# Patient Record
Sex: FEMALE | Race: WHITE | NOT HISPANIC OR LATINO | Employment: OTHER | URBAN - METROPOLITAN AREA
[De-identification: names, ages, dates, MRNs, and addresses within clinical notes are randomized per-mention and may not be internally consistent; named-entity substitution may affect disease eponyms.]

---

## 2017-07-17 ENCOUNTER — TRANSFERRED RECORDS (OUTPATIENT)
Dept: MULTI SPECIALTY CLINIC | Facility: CLINIC | Age: 62
End: 2017-07-17

## 2021-10-14 ENCOUNTER — OFFICE VISIT (OUTPATIENT)
Dept: FAMILY MEDICINE | Facility: CLINIC | Age: 66
End: 2021-10-14
Payer: MEDICARE

## 2021-10-14 VITALS
TEMPERATURE: 97.1 F | OXYGEN SATURATION: 95 % | WEIGHT: 118.6 LBS | RESPIRATION RATE: 16 BRPM | DIASTOLIC BLOOD PRESSURE: 89 MMHG | SYSTOLIC BLOOD PRESSURE: 138 MMHG | HEART RATE: 98 BPM

## 2021-10-14 DIAGNOSIS — D13.1 BENIGN FUNDIC GLAND POLYPS OF STOMACH: ICD-10-CM

## 2021-10-14 DIAGNOSIS — K21.9 GASTROESOPHAGEAL REFLUX DISEASE WITHOUT ESOPHAGITIS: Primary | ICD-10-CM

## 2021-10-14 PROBLEM — J45.20 INTERMITTENT ASTHMA: Status: ACTIVE | Noted: 2021-10-14

## 2021-10-14 PROBLEM — F41.9 ANXIETY: Status: ACTIVE | Noted: 2021-10-14

## 2021-10-14 PROBLEM — R73.01 ELEVATED FASTING GLUCOSE: Status: ACTIVE | Noted: 2021-10-14

## 2021-10-14 LAB
ERYTHROCYTE [DISTWIDTH] IN BLOOD BY AUTOMATED COUNT: 13 % (ref 10–15)
HCT VFR BLD AUTO: 45.2 % (ref 35–47)
HGB BLD-MCNC: 15 G/DL (ref 11.7–15.7)
MCH RBC QN AUTO: 32.1 PG (ref 26.5–33)
MCHC RBC AUTO-ENTMCNC: 33.2 G/DL (ref 31.5–36.5)
MCV RBC AUTO: 97 FL (ref 78–100)
PLATELET # BLD AUTO: 307 10E3/UL (ref 150–450)
RBC # BLD AUTO: 4.67 10E6/UL (ref 3.8–5.2)
WBC # BLD AUTO: 8.9 10E3/UL (ref 4–11)

## 2021-10-14 PROCEDURE — 85027 COMPLETE CBC AUTOMATED: CPT | Performed by: PHYSICIAN ASSISTANT

## 2021-10-14 PROCEDURE — 36415 COLL VENOUS BLD VENIPUNCTURE: CPT | Performed by: PHYSICIAN ASSISTANT

## 2021-10-14 PROCEDURE — 99203 OFFICE O/P NEW LOW 30 MIN: CPT | Performed by: PHYSICIAN ASSISTANT

## 2021-10-14 PROCEDURE — 80053 COMPREHEN METABOLIC PANEL: CPT | Performed by: PHYSICIAN ASSISTANT

## 2021-10-14 RX ORDER — ALPRAZOLAM 0.5 MG
TABLET ORAL
COMMUNITY
Start: 2021-08-27 | End: 2022-08-23

## 2021-10-14 RX ORDER — FAMOTIDINE 20 MG/1
60 TABLET, FILM COATED ORAL
COMMUNITY
End: 2022-08-23

## 2021-10-14 RX ORDER — FLUTICASONE PROPIONATE 110 UG/1
AEROSOL, METERED RESPIRATORY (INHALATION)
COMMUNITY
Start: 2017-06-14 | End: 2022-12-16

## 2021-10-14 RX ORDER — VIT C/E/ZN/COPPR/LUTEIN/ZEAXAN 60 MG-6 MG
1 CAPSULE ORAL DAILY
COMMUNITY

## 2021-10-14 RX ORDER — CITALOPRAM HYDROBROMIDE 10 MG/1
TABLET ORAL
COMMUNITY
Start: 2020-10-01 | End: 2021-11-02

## 2021-10-14 RX ORDER — CALCIUM CARBONATE 500 MG/1
1 TABLET, CHEWABLE ORAL 2 TIMES DAILY PRN
COMMUNITY

## 2021-10-14 RX ORDER — ESOMEPRAZOLE MAGNESIUM 40 MG/1
40 CAPSULE, DELAYED RELEASE ORAL
Qty: 60 CAPSULE | Refills: 1 | Status: SHIPPED | OUTPATIENT
Start: 2021-10-14 | End: 2021-11-02

## 2021-10-14 NOTE — PROGRESS NOTES
Assessment and Plan:     (K21.9) Gastroesophageal reflux disease without esophagitis  (primary encounter diagnosis)  Comment: history of GERD, has been off and on nexium x years, recent bout has been about 6 weeks but hasn't been consistent with PPI, no bloody emesis or stool, no significant weight loss  Plan: CBC with platelets, Comprehensive metabolic         panel (BMP + Alb, Alk Phos, ALT, AST, Total.         Bili, TP), esomeprazole (NEXIUM) 40 MG DR         capsule        Will have her take Nexium daily for next 4-6 weeks, return to clinic if does not resolve, would refer to GI at that time    (D13.1) Benign fundic gland polyps of stomach  Comment: last EGD in 2017 in [place]  Plan: see above    Establish care with Dr. Boyer scheduled in 2 weeks  We discussed reasons to go to the ED      Jenn Muir PA-C        Nathalie Hargrove is a 65 year old who presents for the following health issues    HPI       Chief Complaint   Patient presents with     Gastrointestinal Problem     per pt Acid Reflux x 6 weeks         Moved here a year ago from [place], has not established care w/a pcp  Has had GERD symptoms x 6 weeks  Reports burning and acid taste in the back of throat, worsens in the AM  Symptoms are intermittent and mild  She tried taking Nexium x 10 days and now is on pepcid which has helped     Also reports general soreness around umbilical area--has had before, attributes to anxiety, IBS and GERD    She reports intermittent loose stools which is typical for her    She denies chest pain, shortness of breath, hemoptysis, fever/chills, vomiting, dysuria/frequency    She hasn't had any bloody/black stools, significant weight loss    She had a normal upper endoscopy in 2017 in [place]    Review of Systems   See above      Objective      /89 (BP Location: Left arm, Patient Position: Sitting, Cuff Size: Adult Regular)   Pulse 98   Temp 97.1  F (36.2  C) (Temporal)   Resp 16   Wt 53.8 kg (118 lb 9.6 oz)   SpO2  95%   There is no height or weight on file to calculate BMI.     Physical Exam     GENERAL: healthy, alert and no distress  ENT: mmm, op clear  RESP: lungs clear to auscultation - no rales, no rhonchi, no wheezes  CV: regular rates and rhythm, normal S1 S2, no S3 or S4 and no murmur, no click or rub   ABD: soft, NT, +BS, no masses  MS: extremities- no gross deformities noted, no edema

## 2021-10-15 LAB
ALBUMIN SERPL-MCNC: 3.7 G/DL (ref 3.4–5)
ALP SERPL-CCNC: 76 U/L (ref 40–150)
ALT SERPL W P-5'-P-CCNC: 38 U/L (ref 0–50)
ANION GAP SERPL CALCULATED.3IONS-SCNC: 4 MMOL/L (ref 3–14)
AST SERPL W P-5'-P-CCNC: 25 U/L (ref 0–45)
BILIRUB SERPL-MCNC: 0.9 MG/DL (ref 0.2–1.3)
BUN SERPL-MCNC: 12 MG/DL (ref 7–30)
CALCIUM SERPL-MCNC: 9.1 MG/DL (ref 8.5–10.1)
CHLORIDE BLD-SCNC: 101 MMOL/L (ref 94–109)
CO2 SERPL-SCNC: 29 MMOL/L (ref 20–32)
CREAT SERPL-MCNC: 0.6 MG/DL (ref 0.52–1.04)
GFR SERPL CREATININE-BSD FRML MDRD: >90 ML/MIN/1.73M2
GLUCOSE BLD-MCNC: 95 MG/DL (ref 70–99)
POTASSIUM BLD-SCNC: 3.9 MMOL/L (ref 3.4–5.3)
PROT SERPL-MCNC: 7.8 G/DL (ref 6.8–8.8)
SODIUM SERPL-SCNC: 134 MMOL/L (ref 133–144)

## 2021-10-17 ENCOUNTER — HEALTH MAINTENANCE LETTER (OUTPATIENT)
Age: 66
End: 2021-10-17

## 2021-10-21 DIAGNOSIS — D13.1 BENIGN FUNDIC GLAND POLYPS OF STOMACH: Primary | ICD-10-CM

## 2021-11-01 ASSESSMENT — ENCOUNTER SYMPTOMS
PALPITATIONS: 0
JOINT SWELLING: 0
SORE THROAT: 0
DIZZINESS: 0
COUGH: 0
DIARRHEA: 0
NAUSEA: 0
MYALGIAS: 0
SHORTNESS OF BREATH: 0
ABDOMINAL PAIN: 1
HEADACHES: 0
CHILLS: 0
HEMATOCHEZIA: 0
FREQUENCY: 0
HEARTBURN: 1
HEMATURIA: 0
DYSURIA: 0
FEVER: 0
ARTHRALGIAS: 0
EYE PAIN: 0
BREAST MASS: 0
CONSTIPATION: 0
PARESTHESIAS: 0
NERVOUS/ANXIOUS: 1
WEAKNESS: 0

## 2021-11-01 ASSESSMENT — ACTIVITIES OF DAILY LIVING (ADL): CURRENT_FUNCTION: NO ASSISTANCE NEEDED

## 2021-11-01 NOTE — PROGRESS NOTES
"SUBJECTIVE:   Beena Diaz is a 65 year old female who presents for Preventive Visit.    {Split Bill scripting  The purpose of this visit is to discuss your medical history and prevent health problems before you are sick. You may be responsible for a co-pay, coinsurance, or deductible if your visit today includes services such as checking on a sore throat, having an x-ray or lab test, or treating and evaluating a new or existing condition :551245}  Patient has been advised of split billing requirements and indicates understanding: {Yes and No:518760}   Are you in the first 12 months of your Medicare coverage?  { :804242::\"No\"}    Healthy Habits:     In general, how would you rate your overall health?  Excellent    Frequency of exercise:  4-5 days/week    Duration of exercise:  30-45 minutes    Do you usually eat at least 4 servings of fruit and vegetables a day, include whole grains    & fiber and avoid regularly eating high fat or \"junk\" foods?  Yes    Taking medications regularly:  Yes    Medication side effects:  None    Ability to successfully perform activities of daily living:  No assistance needed    Home Safety:  No safety concerns identified    Hearing Impairment:  No hearing concerns    In the past 6 months, have you been bothered by leaking of urine?  No    In general, how would you rate your overall mental or emotional health?  Good      PHQ-2 Total Score: 0    Additional concerns today:  No    Do you feel safe in your environment? { :009718}    Have you ever done Advance Care Planning? (For example, a Health Directive, POLST, or a discussion with a medical provider or your loved ones about your wishes): { :006390}    {Hearing Test Done (Optional):411851}   Fall risk  { :558512}  {If any of the above assessments are answered yes, consider ordering appropriate referrals (Optional):588139::\"click delete button to remove this line now\"}  Cognitive Screening { :489569}    {Do you have sleep apnea, " excessive snoring or daytime drowsiness? (Optional):141394}    Reviewed and updated as needed this visit by clinical staff                 Reviewed and updated as needed this visit by Provider                Social History     Tobacco Use     Smoking status: Never Smoker     Smokeless tobacco: Never Used   Substance Use Topics     Alcohol use: Not Currently     {Rooming Staff- Complete this question if Prescreen response is not shown below for today's visit. If you drink alcohol do you typically have >3 drinks per day or >7 drinks per week? (Optional):337162}    Alcohol Use 11/1/2021   Prescreen: >3 drinks/day or >7 drinks/week? No   {add AUDIT responses (Optional) (A score of 7 for adult men is an indication of hazardous drinking; a score of 8 or more is an indication of an alcohol use disorder.  A score of 7 or more for adult women is an indication of hazardous drinking or an alchohol use disorder):715191}    {Outside tests to abstract? :402216}    {additional problems to add (Optional):683339}    Current providers sharing in care for this patient include: {Rooming staff:  Please update Care Team in Rooming Activity, refresh this note and then delete this statement}  Patient Care Team:  No Ref-Primary, Physician as PCP - General  Jenn Dallas PA-C as Assigned PCP  Dereje Kahn MD as MD (Gastroenterology)  Jenn Dallas PA-C as Physician Assistant (Internal Medicine)    The following health maintenance items are reviewed in Epic and correct as of today:  Health Maintenance Due   Topic Date Due     DEXA  Never done     ANNUAL REVIEW OF HM ORDERS  Never done     ASTHMA ACTION PLAN  Never done     ASTHMA CONTROL TEST  Never done     ADVANCE CARE PLANNING  Never done     MAMMO SCREENING  Never done     Pneumococcal Vaccine: 65+ Years (1 of 2 - PPSV23) 12/12/1961     COLORECTAL CANCER SCREENING  Never done     HEPATITIS C SCREENING  Never done     DTAP/TDAP/TD IMMUNIZATION (1 -  "Tdap) Never done     LIPID  Never done     ZOSTER IMMUNIZATION (1 of 2) Never done     MEDICARE ANNUAL WELLNESS VISIT  Never done     {Chronicprobdata (optional):305515}  {Decision Support (Optional):845368}    Breast CA Risk Assessment (FHS-7) 11/1/2021   Do you have a family history of breast, colon, or ovarian cancer? No / Unknown       {If any of the questions to the BCRA (FHS-7) are answered yes, consider ordering referral for genetic counseling (Optional) :825534::\"click delete button to remove this line now\"}  {AMB Mammogram Decision Support (Optional) :246368}  Pertinent mammograms are reviewed under the imaging tab.    Review of Systems   Constitutional: Negative for chills and fever.   HENT: Negative for congestion, ear pain, hearing loss and sore throat.    Eyes: Negative for pain and visual disturbance.   Respiratory: Negative for cough and shortness of breath.    Cardiovascular: Negative for chest pain, palpitations and peripheral edema.   Gastrointestinal: Positive for abdominal pain and heartburn. Negative for constipation, diarrhea, hematochezia and nausea.   Breasts:  Negative for tenderness, breast mass and discharge.   Genitourinary: Negative for dysuria, frequency, genital sores, hematuria, pelvic pain, urgency, vaginal bleeding and vaginal discharge.   Musculoskeletal: Negative for arthralgias, joint swelling and myalgias.   Skin: Negative for rash.   Neurological: Negative for dizziness, weakness, headaches and paresthesias.   Psychiatric/Behavioral: Negative for mood changes. The patient is nervous/anxious.      {ROS COMP (Optional):411122}    OBJECTIVE:   There were no vitals taken for this visit. There is no height or weight on file to calculate BMI.  Physical Exam  {Exam (Optional) :535126}    {Diagnostic Test Results (Optional):268194::\"Diagnostic Test Results:\",\"Labs reviewed in Epic\"}    ASSESSMENT / PLAN:   {Diag Picklist:011805}    Patient has been advised of split billing requirements " "and indicates understanding: {YES / NO:079258::\"Yes\"}  COUNSELING:  {Medicare Counselin}    There is no height or weight on file to calculate BMI.    {Weight Management Plan (ACO) Complete if BMI is abnormal-  Ages 18-64  BMI >24.9.  Age 65+ with BMI <23 or >30 (Optional):646176}    She reports that she has never smoked. She has never used smokeless tobacco.      Appropriate preventive services were discussed with this patient, including applicable screening as appropriate for cardiovascular disease, diabetes, osteopenia/osteoporosis, and glaucoma.  As appropriate for age/gender, discussed screening for colorectal cancer, prostate cancer, breast cancer, and cervical cancer. Checklist reviewing preventive services available has been given to the patient.    Reviewed patients plan of care and provided an AVS. The {CarePlan:617864} for Beena meets the Care Plan requirement. This Care Plan has been established and reviewed with the {PATIENT, FAMILY MEMBER, CAREGIVER:704184}.    Counseling Resources:  ATP IV Guidelines  Pooled Cohorts Equation Calculator  Breast Cancer Risk Calculator  Breast Cancer: Medication to Reduce Risk  FRAX Risk Assessment  ICSI Preventive Guidelines  Dietary Guidelines for Americans,   [employer]'s MyPlate  ASA Prophylaxis  Lung CA Screening    Tammy Boyer MD  [facility] [place]    Identified Health Risks:  "

## 2021-11-02 ENCOUNTER — OFFICE VISIT (OUTPATIENT)
Dept: FAMILY MEDICINE | Facility: CLINIC | Age: 66
End: 2021-11-02
Payer: MEDICARE

## 2021-11-02 VITALS
HEART RATE: 93 BPM | SYSTOLIC BLOOD PRESSURE: 155 MMHG | WEIGHT: 118 LBS | TEMPERATURE: 97.5 F | BODY MASS INDEX: 18.96 KG/M2 | DIASTOLIC BLOOD PRESSURE: 88 MMHG | RESPIRATION RATE: 16 BRPM | OXYGEN SATURATION: 98 % | HEIGHT: 66 IN

## 2021-11-02 DIAGNOSIS — Z13.29 SCREENING FOR THYROID DISORDER: ICD-10-CM

## 2021-11-02 DIAGNOSIS — F41.1 GENERALIZED ANXIETY DISORDER: ICD-10-CM

## 2021-11-02 DIAGNOSIS — Z11.59 SCREENING FOR VIRAL DISEASE: ICD-10-CM

## 2021-11-02 DIAGNOSIS — K58.9 IRRITABLE BOWEL SYNDROME, UNSPECIFIED TYPE: ICD-10-CM

## 2021-11-02 DIAGNOSIS — Z11.59 NEED FOR HEPATITIS C SCREENING TEST: ICD-10-CM

## 2021-11-02 DIAGNOSIS — J45.990 EXERCISE-INDUCED ASTHMA: ICD-10-CM

## 2021-11-02 DIAGNOSIS — E78.5 HYPERLIPIDEMIA, UNSPECIFIED HYPERLIPIDEMIA TYPE: ICD-10-CM

## 2021-11-02 DIAGNOSIS — Z78.0 ASYMPTOMATIC POSTMENOPAUSAL STATUS: ICD-10-CM

## 2021-11-02 DIAGNOSIS — K31.7 BENIGN GASTRIC POLYP: ICD-10-CM

## 2021-11-02 DIAGNOSIS — K21.9 GASTROESOPHAGEAL REFLUX DISEASE WITHOUT ESOPHAGITIS: Primary | ICD-10-CM

## 2021-11-02 DIAGNOSIS — Z12.31 VISIT FOR SCREENING MAMMOGRAM: ICD-10-CM

## 2021-11-02 DIAGNOSIS — R03.0 ELEVATED BLOOD PRESSURE READING WITHOUT DIAGNOSIS OF HYPERTENSION: ICD-10-CM

## 2021-11-02 PROBLEM — R73.03 PREDIABETES: Status: ACTIVE | Noted: 2017-04-18

## 2021-11-02 PROBLEM — K58.0 IRRITABLE BOWEL SYNDROME WITH DIARRHEA: Status: ACTIVE | Noted: 2020-01-09

## 2021-11-02 PROBLEM — F41.9 ANXIETY: Status: ACTIVE | Noted: 2017-04-14

## 2021-11-02 PROBLEM — Z86.0101 HX OF ADENOMATOUS COLONIC POLYPS: Status: ACTIVE | Noted: 2017-06-09

## 2021-11-02 PROBLEM — Z98.890 HX OF COLONOSCOPY: Status: ACTIVE | Noted: 2017-06-09

## 2021-11-02 PROBLEM — R51.9 HEADACHE: Status: ACTIVE | Noted: 2019-05-09

## 2021-11-02 PROCEDURE — 99215 OFFICE O/P EST HI 40 MIN: CPT | Performed by: INTERNAL MEDICINE

## 2021-11-02 RX ORDER — ALBUTEROL SULFATE 90 UG/1
AEROSOL, METERED RESPIRATORY (INHALATION)
COMMUNITY
Start: 2021-02-01 | End: 2022-12-16

## 2021-11-02 RX ORDER — CITALOPRAM HYDROBROMIDE 10 MG/1
15 TABLET ORAL DAILY
Qty: 45 TABLET | Refills: 1 | Status: SHIPPED | OUTPATIENT
Start: 2021-11-02 | End: 2021-12-07

## 2021-11-02 ASSESSMENT — ENCOUNTER SYMPTOMS
DIZZINESS: 0
SHORTNESS OF BREATH: 0
HEMATOCHEZIA: 0
JOINT SWELLING: 0
SORE THROAT: 0
FREQUENCY: 0
BREAST MASS: 0
CHILLS: 0
CONSTIPATION: 0
HEARTBURN: 1
WEAKNESS: 0
NERVOUS/ANXIOUS: 1
ARTHRALGIAS: 0
DIARRHEA: 0
FEVER: 0
NAUSEA: 0
HEADACHES: 0
DYSURIA: 0
PALPITATIONS: 0
MYALGIAS: 0
PARESTHESIAS: 0
HEMATURIA: 0
COUGH: 0
EYE PAIN: 0
ABDOMINAL PAIN: 1

## 2021-11-02 ASSESSMENT — ACTIVITIES OF DAILY LIVING (ADL): CURRENT_FUNCTION: NO ASSISTANCE NEEDED

## 2021-11-02 ASSESSMENT — MIFFLIN-ST. JEOR: SCORE: 1091.99

## 2021-11-02 NOTE — PATIENT INSTRUCTIONS
Continue pepcid   Go to down to 20 mg twice a day after symptoms are under control  You are due for mammogram and bone density  Please call the following number to make appointment :  [phone]  It is located in [address]  The dose of celexa  is increased to 15 mg daily  Follow up in 6-8 weeks  Seek sooner medical attention if there is any worsening of symptoms or problems.

## 2021-11-02 NOTE — PROGRESS NOTES
Assessment & Plan     Diagnoses and all orders for this visit:    Gastroesophageal reflux disease without esophagitis  She is a new patient to me  She has history of acid reflux and gastric polyps  She had endoscopy in 2017  Results are available in care everywhere  Those were reviewed she does not feel comfortable taking Nexium  As worry about side effects  She is taking Pepcid 40 mg at night and 20 mg during daytime  We discussed about lowering the dose after taking it for a few weeks to 20 mg twice a day  Either she can do 40 mg at bedtime or 20 mg twice a day which is average dose  Patient is in agreement  Her symptoms are improving  She is also taking Tums on as-needed basis  She has appointment to see a gastroenterologist in February  As her endoscopy results were benign and her symptoms are improving  I am okay to wait till that time as a specialist's are very busy  If anything changes we can request sooner appointment  Her anxiety is making her acid reflux symptoms worse  She got  from her   She moved from [place]    Benign gastric polyp  See the note above  There is a results of endoscopy available in care everywhere dated July 17, 2017    Generalized anxiety disorder  -     citalopram (CELEXA) 10 MG tablet; Take 1.5 tablets (15 mg) by mouth daily  Patient has lots of anxiety  She is followed by the counselor  The trigger was that her  and she got   Her  brought her here from [place] and then left  She has a daughter who lives here  She is on 10 mg of Celexa  She would like to increase little bit  As her anxiety makes her acid reflux worse as well as gives her some jaw pain  It is on the right side    Irritable bowel syndrome, unspecified type  It is well controlled currently    Exercise-induced asthma  It is controlled    Hyperlipidemia, unspecified hyperlipidemia type  -     Lipid panel reflex to direct LDL Fasting; Future    Screening for thyroid disorder  -  "    TSH with free T4 reflex; Future    Need for hepatitis C screening test  -     Hepatitis C Screen Reflex to HCV RNA Quant and Genotype; Future    Visit for screening mammogram  -     MA Screen Bilateral w/Kevin; Future    Screening for viral disease  -     Hepatitis C Screen Reflex to HCV RNA Quant and Genotype; Future    Asymptomatic postmenopausal status  -     DX Hip/Pelvis/Spine; Future    We discussed about cardiac work-up but patient says she does not have any risk factors and she feels comfortable not pursuing that  Per patient she had that in the past    I reviewed some of the information from care everywhere    Patient has elevated blood pressure  She thinks is anxiety related  I will send her a note to monitor that and let me know if it stays consistently high  Patient would be considered if needed     See Patient Instructions  Patient Instructions   Continue pepcid   Go to down to 20 mg twice a day after symptoms are under control  You are due for mammogram and bone density  Please call the following number to make appointment :  [phone]  It is located in [address]  The dose of celexa  is increased to 15 mg daily  Follow up in 6-8 weeks  Seek sooner medical attention if there is any worsening of symptoms or problems.            Return in about 2 months (around 1/2/2022) for wellness visit.    Tammy Boyer MD  [facility] FRANKY Hargrove is a 65 year old who presents for the following health issues     Healthy Habits:     In general, how would you rate your overall health?  Excellent    Frequency of exercise:  4-5 days/week    Duration of exercise:  30-45 minutes    Do you usually eat at least 4 servings of fruit and vegetables a day, include whole grains    & fiber and avoid regularly eating high fat or \"junk\" foods?  Yes    Taking medications regularly:  Yes    Medication side effects:  None    Ability to successfully perform activities of daily living:  No " "assistance needed    Home Safety:  No safety concerns identified    Hearing Impairment:  No hearing concerns    In the past 6 months, have you been bothered by leaking of urine?  No    In general, how would you rate your overall mental or emotional health?  Good      PHQ-2 Total Score: 0    Additional concerns today:  No       Establish Care - New Patient    History of anxiety   from  and that made it worse  Daughter lives here in [place]  Mother is in [place] and she is in her 90s  She is retired RN  Retired in 2014  She does follow therapist therapist       Review of Systems   Constitutional: Negative for chills and fever.   HENT: Negative for congestion, ear pain, hearing loss and sore throat.    Eyes: Negative for pain and visual disturbance.   Respiratory: Negative for cough and shortness of breath.    Cardiovascular: Negative for chest pain, palpitations and peripheral edema.   Gastrointestinal: Positive for abdominal pain and heartburn. Negative for constipation, diarrhea, hematochezia and nausea.   Breasts:  Negative for tenderness, breast mass and discharge.   Genitourinary: Negative for dysuria, frequency, genital sores, hematuria, pelvic pain, urgency, vaginal bleeding and vaginal discharge.   Musculoskeletal: Negative for arthralgias, joint swelling and myalgias.   Skin: Negative for rash.   Neurological: Negative for dizziness, weakness, headaches and paresthesias.   Psychiatric/Behavioral: Negative for mood changes. The patient is nervous/anxious.       Constitutional, HEENT, cardiovascular, pulmonary, GI, , musculoskeletal, neuro, skin, endocrine and psych systems are negative, except as otherwise noted.      Objective    BP (!) 155/88 (BP Location: Right arm, Cuff Size: Adult Regular)   Pulse 93   Temp 97.5  F (36.4  C) (Temporal)   Resp 16   Ht 1.676 m (5' 6\")   Wt 53.5 kg (118 lb)   SpO2 98%   Breastfeeding No   BMI 19.05 kg/m    Body mass index is 19.05 kg/m .  Physical " Exam   She is very nice and pleasant.  She is comfortable and not in any kind of distress.  She is fully alert awake oriented.      Will do her exam on next visit    40 minutes spent on the date of the encounter doing chart review, history and exam, documentation and further activities as noted above      Disclaimer: This note consists of symbols derived from keyboarding, dictation and/or voice recognition software. As a result, there may be errors in the script that have gone undetected. Please consider this when interpreting information found in this chart.

## 2021-11-11 ENCOUNTER — LAB (OUTPATIENT)
Dept: LAB | Facility: CLINIC | Age: 66
End: 2021-11-11
Payer: MEDICARE

## 2021-11-11 DIAGNOSIS — Z13.29 SCREENING FOR THYROID DISORDER: ICD-10-CM

## 2021-11-11 DIAGNOSIS — Z11.59 SCREENING FOR VIRAL DISEASE: ICD-10-CM

## 2021-11-11 DIAGNOSIS — E78.5 HYPERLIPIDEMIA, UNSPECIFIED HYPERLIPIDEMIA TYPE: ICD-10-CM

## 2021-11-11 DIAGNOSIS — Z11.59 NEED FOR HEPATITIS C SCREENING TEST: ICD-10-CM

## 2021-11-11 LAB
CHOLEST SERPL-MCNC: 210 MG/DL
FASTING STATUS PATIENT QL REPORTED: YES
HCV AB SERPL QL IA: NONREACTIVE
HDLC SERPL-MCNC: 93 MG/DL
LDLC SERPL CALC-MCNC: 98 MG/DL
NONHDLC SERPL-MCNC: 117 MG/DL
TRIGL SERPL-MCNC: 94 MG/DL
TSH SERPL DL<=0.005 MIU/L-ACNC: 3.6 MU/L (ref 0.4–4)

## 2021-11-11 PROCEDURE — 84443 ASSAY THYROID STIM HORMONE: CPT

## 2021-11-11 PROCEDURE — 86803 HEPATITIS C AB TEST: CPT

## 2021-11-11 PROCEDURE — 80061 LIPID PANEL: CPT

## 2021-11-11 PROCEDURE — 36415 COLL VENOUS BLD VENIPUNCTURE: CPT

## 2021-12-01 ENCOUNTER — HOSPITAL ENCOUNTER (OUTPATIENT)
Dept: MAMMOGRAPHY | Facility: CLINIC | Age: 66
End: 2021-12-01
Attending: INTERNAL MEDICINE
Payer: MEDICARE

## 2021-12-01 ENCOUNTER — HOSPITAL ENCOUNTER (OUTPATIENT)
Dept: BONE DENSITY | Facility: CLINIC | Age: 66
End: 2021-12-01
Attending: INTERNAL MEDICINE
Payer: MEDICARE

## 2021-12-01 DIAGNOSIS — Z12.31 VISIT FOR SCREENING MAMMOGRAM: ICD-10-CM

## 2021-12-01 DIAGNOSIS — Z78.0 ASYMPTOMATIC POSTMENOPAUSAL STATUS: ICD-10-CM

## 2021-12-01 PROCEDURE — 77063 BREAST TOMOSYNTHESIS BI: CPT

## 2021-12-01 PROCEDURE — 77080 DXA BONE DENSITY AXIAL: CPT

## 2021-12-01 NOTE — RESULT ENCOUNTER NOTE
Araceli Hargrove,    This is to inform you regarding your test result.    The bone density shows some  Osteoporosis.  You should be taking 7183-5196 mg of calcium with vit D.  You should be exercising regularly.  Please make appointment to discuss the treatment options  Virtual visit is ok      Sincerely,      Dr.Nasima Merlin MD,FACP

## 2021-12-05 DIAGNOSIS — F41.1 GENERALIZED ANXIETY DISORDER: ICD-10-CM

## 2021-12-07 RX ORDER — CITALOPRAM HYDROBROMIDE 10 MG/1
TABLET ORAL
Qty: 45 TABLET | Refills: 1 | Status: SHIPPED | OUTPATIENT
Start: 2021-12-07 | End: 2022-02-07

## 2021-12-07 NOTE — TELEPHONE ENCOUNTER
Prescription approved per [facility] Refill Protocol.  Amie Toro, RN  [facility] RN Triage Team

## 2021-12-15 NOTE — RESULT ENCOUNTER NOTE
Araceli Hargrove,    This is to inform you regarding your test result.    Mammogram result is satisfactory .  Recommendation: annual mammography      Sincerely,      Dr.Nasima Merlin MD,FACP

## 2021-12-19 ASSESSMENT — ENCOUNTER SYMPTOMS
DYSURIA: 0
NAUSEA: 0
PALPITATIONS: 0
EYE PAIN: 0
MYALGIAS: 0
FREQUENCY: 0
ABDOMINAL PAIN: 0
HEARTBURN: 1
JOINT SWELLING: 0
HEMATOCHEZIA: 0
FEVER: 0
HEADACHES: 0
PARESTHESIAS: 0
SORE THROAT: 0
NERVOUS/ANXIOUS: 0
CONSTIPATION: 0
COUGH: 0
DIZZINESS: 0
ARTHRALGIAS: 0
SHORTNESS OF BREATH: 0
HEMATURIA: 0
WEAKNESS: 0
CHILLS: 0
DIARRHEA: 0
BREAST MASS: 0

## 2021-12-19 ASSESSMENT — ACTIVITIES OF DAILY LIVING (ADL): CURRENT_FUNCTION: NO ASSISTANCE NEEDED

## 2021-12-20 ENCOUNTER — OFFICE VISIT (OUTPATIENT)
Dept: FAMILY MEDICINE | Facility: CLINIC | Age: 66
End: 2021-12-20
Payer: MEDICARE

## 2021-12-20 VITALS
WEIGHT: 117 LBS | RESPIRATION RATE: 16 BRPM | SYSTOLIC BLOOD PRESSURE: 130 MMHG | TEMPERATURE: 96.9 F | HEIGHT: 65 IN | OXYGEN SATURATION: 99 % | HEART RATE: 101 BPM | BODY MASS INDEX: 19.49 KG/M2 | DIASTOLIC BLOOD PRESSURE: 70 MMHG

## 2021-12-20 DIAGNOSIS — M81.0 AGE-RELATED OSTEOPOROSIS WITHOUT CURRENT PATHOLOGICAL FRACTURE: ICD-10-CM

## 2021-12-20 DIAGNOSIS — D22.9 NUMEROUS MOLES: ICD-10-CM

## 2021-12-20 DIAGNOSIS — R03.0 ELEVATED BLOOD PRESSURE READING WITHOUT DIAGNOSIS OF HYPERTENSION: ICD-10-CM

## 2021-12-20 DIAGNOSIS — R07.89 ATYPICAL CHEST PAIN: ICD-10-CM

## 2021-12-20 DIAGNOSIS — Z23 NEED FOR VACCINATION: ICD-10-CM

## 2021-12-20 DIAGNOSIS — R10.13 EPIGASTRIC PAIN: ICD-10-CM

## 2021-12-20 DIAGNOSIS — Z00.00 ENCOUNTER FOR MEDICARE ANNUAL WELLNESS EXAM: Primary | ICD-10-CM

## 2021-12-20 DIAGNOSIS — K31.7 MULTIPLE GASTRIC POLYPS: ICD-10-CM

## 2021-12-20 DIAGNOSIS — K21.9 GASTROESOPHAGEAL REFLUX DISEASE WITHOUT ESOPHAGITIS: ICD-10-CM

## 2021-12-20 PROCEDURE — G0009 ADMIN PNEUMOCOCCAL VACCINE: HCPCS | Performed by: INTERNAL MEDICINE

## 2021-12-20 PROCEDURE — 90732 PPSV23 VACC 2 YRS+ SUBQ/IM: CPT | Performed by: INTERNAL MEDICINE

## 2021-12-20 PROCEDURE — 99214 OFFICE O/P EST MOD 30 MIN: CPT | Mod: 25 | Performed by: INTERNAL MEDICINE

## 2021-12-20 PROCEDURE — G0438 PPPS, INITIAL VISIT: HCPCS | Performed by: INTERNAL MEDICINE

## 2021-12-20 RX ORDER — EPINEPHRINE 1 MG/ML
0.3 INJECTION, SOLUTION, CONCENTRATE INTRAVENOUS EVERY 5 MIN PRN
Status: CANCELLED | OUTPATIENT
Start: 2021-12-20

## 2021-12-20 RX ORDER — SUCRALFATE 1 G/1
1 TABLET ORAL 2 TIMES DAILY PRN
Qty: 60 TABLET | Refills: 1 | Status: SHIPPED | OUTPATIENT
Start: 2021-12-20 | End: 2022-03-17

## 2021-12-20 RX ORDER — ZINC OXIDE 13 %
CREAM (GRAM) TOPICAL
COMMUNITY
End: 2022-06-06

## 2021-12-20 RX ORDER — HEPARIN SODIUM (PORCINE) LOCK FLUSH IV SOLN 100 UNIT/ML 100 UNIT/ML
5 SOLUTION INTRAVENOUS
Status: CANCELLED | OUTPATIENT
Start: 2021-12-20

## 2021-12-20 RX ORDER — DIPHENHYDRAMINE HYDROCHLORIDE 50 MG/ML
50 INJECTION INTRAMUSCULAR; INTRAVENOUS
Status: CANCELLED
Start: 2021-12-20

## 2021-12-20 RX ORDER — ALBUTEROL SULFATE 0.83 MG/ML
2.5 SOLUTION RESPIRATORY (INHALATION)
Status: CANCELLED | OUTPATIENT
Start: 2021-12-20

## 2021-12-20 RX ORDER — MEPERIDINE HYDROCHLORIDE 25 MG/ML
25 INJECTION INTRAMUSCULAR; INTRAVENOUS; SUBCUTANEOUS EVERY 30 MIN PRN
Status: CANCELLED | OUTPATIENT
Start: 2021-12-20

## 2021-12-20 RX ORDER — HEPARIN SODIUM,PORCINE 10 UNIT/ML
5 VIAL (ML) INTRAVENOUS
Status: CANCELLED | OUTPATIENT
Start: 2021-12-20

## 2021-12-20 RX ORDER — ALBUTEROL SULFATE 90 UG/1
1-2 AEROSOL, METERED RESPIRATORY (INHALATION)
Status: CANCELLED
Start: 2021-12-20

## 2021-12-20 RX ORDER — NALOXONE HYDROCHLORIDE 0.4 MG/ML
0.2 INJECTION, SOLUTION INTRAMUSCULAR; INTRAVENOUS; SUBCUTANEOUS
Status: CANCELLED | OUTPATIENT
Start: 2021-12-20

## 2021-12-20 RX ORDER — ZOLEDRONIC ACID 5 MG/100ML
5 INJECTION, SOLUTION INTRAVENOUS ONCE
Status: CANCELLED
Start: 2021-12-20 | End: 2021-12-20

## 2021-12-20 RX ORDER — METHYLPREDNISOLONE SODIUM SUCCINATE 125 MG/2ML
125 INJECTION, POWDER, LYOPHILIZED, FOR SOLUTION INTRAMUSCULAR; INTRAVENOUS
Status: CANCELLED
Start: 2021-12-20

## 2021-12-20 ASSESSMENT — ENCOUNTER SYMPTOMS
BREAST MASS: 0
SHORTNESS OF BREATH: 0
CHILLS: 0
FEVER: 0
PARESTHESIAS: 0
COUGH: 0
HEARTBURN: 1
HEMATOCHEZIA: 0
SORE THROAT: 0
DYSURIA: 0
NAUSEA: 0
ABDOMINAL PAIN: 0
FREQUENCY: 0
ARTHRALGIAS: 0
MYALGIAS: 0
DIZZINESS: 0
WEAKNESS: 0
NERVOUS/ANXIOUS: 0
JOINT SWELLING: 0
PALPITATIONS: 0
HEADACHES: 0
CONSTIPATION: 0
EYE PAIN: 0
DIARRHEA: 0
HEMATURIA: 0

## 2021-12-20 ASSESSMENT — ACTIVITIES OF DAILY LIVING (ADL): CURRENT_FUNCTION: NO ASSISTANCE NEEDED

## 2021-12-20 ASSESSMENT — PAIN SCALES - GENERAL: PAINLEVEL: NO PAIN (0)

## 2021-12-20 ASSESSMENT — MIFFLIN-ST. JEOR: SCORE: 1059.71

## 2021-12-20 NOTE — PATIENT INSTRUCTIONS
There is this is a new shingles vaccine available called shingrex  It is a series of 2 shots 2-6 months apart.  Considered more than 90% effective.  Please go to any pharmacy to get the  vaccine  Monitor your blood pressure once a week  at home.  Bring those readings on your next visit.  Notify us if your blood pressure readings consistently stays greater than 140/90.  Please call [facility]  [phone] to schedule Echocardiogram   It is done at [address]  Please call the following number to schedule your IV Reclast  [phone] for infusion center  Follow up in 3 months  Seek sooner medical attention if there is any worsening of symptoms or problems.        Patient Education   Personalized Prevention Plan  You are due for the preventive services outlined below.  Your care team is available to assist you in scheduling these services.  If you have already completed any of these items, please share that information with your care team to update in your medical record.  Health Maintenance Due   Topic Date Due     ANNUAL REVIEW OF HM ORDERS  Never done     Asthma Action Plan - yearly  Never done     Asthma Control Test  Never done     Diptheria Tetanus Pertussis (DTAP/TDAP/TD) Vaccine (1 - Tdap) Never done     Zoster (Shingles) Vaccine (1 of 2) Never done     Preventive Health Recommendations    See your health care provider every year to    Review health changes.     Discuss preventive care.      Review your medicines if your doctor has prescribed any.    You no longer need a yearly Pap test unless you've had an abnormal Pap test in the past 10 years. If you have vaginal symptoms, such as bleeding or discharge, be sure to talk with your provider about a Pap test.    Every 1 to 2 years, have a mammogram.  If you are over 69, talk with your health care provider about whether or not you want to continue having screening mammograms.    Every 10 years, have a colonoscopy. Or, have a yearly FIT test (stool  test). These exams will check for colon cancer.     Have a cholesterol test every 5 years, or more often if your doctor advises it.     Have a diabetes test (fasting glucose) every three years. If you are at risk for diabetes, you should have this test more often.     At age 65, have a bone density scan (DEXA) to check for osteoporosis (brittle bone disease).    Shots:    Get a flu shot each year.    Get a tetanus shot every 10 years.    Talk to your doctor about your pneumonia vaccines. There are now two you should receive - Pneumovax (PPSV 23) and Prevnar (PCV 13).    Talk to your pharmacist about the shingles vaccine.    Talk to your doctor about the hepatitis B vaccine.    Nutrition:     Eat at least 5 servings of fruits and vegetables each day.    Eat whole-grain bread, whole-wheat pasta and brown rice instead of white grains and rice.    Get adequate Calcium and Vitamin D.     Lifestyle    Exercise at least 150 minutes a week (30 minutes a day, 5 days a week). This will help you control your weight and prevent disease.    Limit alcohol to one drink per day.    No smoking.     Wear sunscreen to prevent skin cancer.     See your dentist twice a year for an exam and cleaning.    See your eye doctor every 1 to 2 years to screen for conditions such as glaucoma, macular degeneration and cataracts.    Personalized Prevention Plan  You are due for the preventive services outlined below.  Your care team is available to assist you in scheduling these services.  If you have already completed any of these items, please share that information with your care team to update in your medical record.  Health Maintenance   Topic Date Due     ANNUAL REVIEW OF HM ORDERS  Never done     ASTHMA ACTION PLAN  Never done     ASTHMA CONTROL TEST  Never done     DTAP/TDAP/TD IMMUNIZATION (1 - Tdap) Never done     ZOSTER IMMUNIZATION (1 of 2) Never done     FALL RISK ASSESSMENT  10/14/2022     MEDICARE ANNUAL WELLNESS VISIT  12/20/2022      MAMMO SCREENING  12/01/2023     Pneumococcal Vaccine: 65+ Years (1 of 1 - PPSV23) 01/09/2025     LIPID  11/11/2026     ADVANCE CARE PLANNING  12/20/2026     COLORECTAL CANCER SCREENING  07/17/2027     DEXA  12/01/2036     HEPATITIS C SCREENING  Completed     PHQ-2  Completed     INFLUENZA VACCINE  Completed     COVID-19 Vaccine  Completed     IPV IMMUNIZATION  Aged Out     MENINGITIS IMMUNIZATION  Aged Out     HEPATITIS B IMMUNIZATION  Aged Out

## 2021-12-20 NOTE — LETTER
My Asthma Action Plan    Name: Beena Diaz   YOB: 1955  Date: 12/20/2021   My doctor: Tammy Boyer MD   My clinic: [facility]        My Rescue Medicine:   Albuterol inhaler (Proair/Ventolin/Proventil HFA)  2-4 puffs EVERY 4 HOURS as needed. Use a spacer if recommended by your provider.   My Asthma Severity:   Intermittent / Exercise Induced  Know your asthma triggers: exercise or sports             GREEN ZONE   Good Control    I feel good    No cough or wheeze    Can work, sleep and play without asthma symptoms       Take your asthma control medicine every day.     1. If exercise triggers your asthma, take your rescue medication    15 minutes before exercise or sports, and    During exercise if you have asthma symptoms  2. Spacer to use with inhaler: If you have a spacer, make sure to use it with your inhaler             YELLOW ZONE Getting Worse  I have ANY of these:    I do not feel good    Cough or wheeze    Chest feels tight    Wake up at night   1. Keep taking your Green Zone medications  2. Start taking your rescue medicine:    every 20 minutes for up to 1 hour. Then every 4 hours for 24-48 hours.  3. If you stay in the Yellow Zone for more than 12-24 hours, contact your doctor.  4. If you do not return to the Green Zone in 12-24 hours or you get worse, start taking your oral steroid medicine if prescribed by your provider.           RED ZONE Medical Alert - Get Help  I have ANY of these:    I feel awful    Medicine is not helping    Breathing getting harder    Trouble walking or talking    Nose opens wide to breathe       1. Take your rescue medicine NOW  2. If your provider has prescribed an oral steroid medicine, start taking it NOW  3. Call your doctor NOW  4. If you are still in the Red Zone after 20 minutes and you have not reached your doctor:    Take your rescue medicine again and    Call 911 or go to the emergency room right away    See your regular doctor  within 2 weeks of an Emergency Room or Urgent Care visit for follow-up treatment.          Annual Reminders:  Meet with Asthma Educator,  Flu Shot in the Fall, consider Pneumonia Vaccination for patients with asthma (aged 19 and older).    Pharmacy: [id number] [place], [place] - [address]    Electronically signed by Tammy Boyer MD   Date: 12/20/21                    Asthma Triggers  How To Control Things That Make Your Asthma Worse    Triggers are things that make your asthma worse.  Look at the list below to help you find your triggers and   what you can do about them. You can help prevent asthma flare-ups by staying away from your triggers.      Trigger                                                          What you can do   Cigarette Smoke  Tobacco smoke can make asthma worse. Do not allow smoking in your home, car or around you.  Be sure no one smokes at a child s day care or school.  If you smoke, ask your health care provider for ways to help you quit.  Ask family members to quit too.  Ask your health care provider for a referral to Quit Plan to help you quit smoking, or call 2-597-604PLAN.     Colds, Flu, Bronchitis  These are common triggers of asthma. Wash your hands often.  Don t touch your eyes, nose or mouth.  Get a flu shot every year.     Dust Mites  These are tiny bugs that live in cloth or carpet. They are too small to see. Wash sheets and blankets in hot water every week.   Encase pillows and mattress in dust mite proof covers.  Avoid having carpet if you can. If you have carpet, vacuum weekly.   Use a dust mask and HEPA vacuum.   Pollen and Outdoor Mold  Some people are allergic to trees, grass, or weed pollen, or molds. Try to keep your windows closed.  Limit time out doors when pollen count is high.   Ask you health care provider about taking medicine during allergy season.     Animal Dander  Some people are allergic to skin flakes, urine or saliva from pets with fur  or feathers. Keep pets with fur or feathers out of your home.    If you can t keep the pet outdoors, then keep the pet out of your bedroom.  Keep the bedroom door closed.  Keep pets off cloth furniture and away from stuffed toys.     Mice, Rats, and Cockroaches  Some people are allergic to the waste from these pests.   Cover food and garbage.  Clean up spills and food crumbs.  Store grease in the refrigerator.   Keep food out of the bedroom.   Indoor Mold  This can be a trigger if your home has high moisture. Fix leaking faucets, pipes, or other sources of water.   Clean moldy surfaces.  Dehumidify basement if it is damp and smelly.   Smoke, Strong Odors, and Sprays  These can reduce air quality. Stay away from strong odors and sprays, such as perfume, powder, hair spray, paints, smoke incense, paint, cleaning products, candles and new carpet.   Exercise or Sports  Some people with asthma have this trigger. Be active!  Ask your doctor about taking medicine before sports or exercise to prevent symptoms.    Warm up for 5-10 minutes before and after sports or exercise.     Other Triggers of Asthma  Cold air:  Cover your nose and mouth with a scarf.  Sometimes laughing or crying can be a trigger.  Some medicines and food can trigger asthma.

## 2021-12-20 NOTE — PROGRESS NOTES
"SUBJECTIVE:   Beena Diaz is a 66 year old female who presents for Preventive Visit.      Patient has been advised of split billing requirements and indicates understanding: Yes   Are you in the first 12 months of your Medicare coverage?  No    Healthy Habits:     In general, how would you rate your overall health?  Good    Frequency of exercise:  4-5 days/week    Duration of exercise:  30-45 minutes    Do you usually eat at least 4 servings of fruit and vegetables a day, include whole grains    & fiber and avoid regularly eating high fat or \"junk\" foods?  Yes    Taking medications regularly:  Yes    Medication side effects:  None    Ability to successfully perform activities of daily living:  No assistance needed    Home Safety:  No safety concerns identified    Hearing Impairment:  No hearing concerns    In the past 6 months, have you been bothered by leaking of urine?  No    In general, how would you rate your overall mental or emotional health?  Good      PHQ-2 Total Score: 0    Do you feel safe in your environment? Yes    Have you ever done Advance Care Planning? (For example, a Health Directive, POLST, or a discussion with a medical provider or your loved ones about your wishes): Yes, patient states has an Advance Care Planning document and will bring a copy to the clinic.       Fall risk  Fallen 2 or more times in the past year?: No  Any fall with injury in the past year?: No    Cognitive Screening   1) Repeat 3 items (Leader, Season, Table)    2) Clock draw: NORMAL  3) 3 item recall: Recalls 3 objects  Results: 3 items recalled: COGNITIVE IMPAIRMENT LESS LIKELY    Mini-CogTM Copyright NANCY Weinstein. Licensed by the author for use in [facility]; reprinted with permission (karlo@.[email or web address]). All rights reserved.      Do you have sleep apnea, excessive snoring or daytime drowsiness?: no    Reviewed and updated as needed this visit by clinical staff  Tobacco  Allergies  Meds             Reviewed and " updated as needed this visit by Provider               Social History     Tobacco Use     Smoking status: Never Smoker     Smokeless tobacco: Never Used   Substance Use Topics     Alcohol use: Not Currently     If you drink alcohol do you typically have >3 drinks per day or >7 drinks per week? No    Alcohol Use 12/20/2021   Prescreen: >3 drinks/day or >7 drinks/week? -   Prescreen: >3 drinks/day or >7 drinks/week? No       Current providers sharing in care for this patient include:   Patient Care Team:  Tammy Boyer MD as PCP - General (Internal Medicine)  Dereje Kahn MD as MD (Gastroenterology)  Tammy Boyer MD as Assigned PCP    The following health maintenance items are reviewed in Epic and correct as of today:  Health Maintenance Due   Topic Date Due     ASTHMA CONTROL TEST  Never done     ZOSTER IMMUNIZATION (1 of 2) Never done     Home Blood Pressure readings are good   Any new diagnosis of family breast, ovarian, or bowel cancer? No    Breast CA Risk Assessment (FHS-7) 11/1/2021 12/1/2021   Do you have a family history of breast, colon, or ovarian cancer? No / Unknown No / Unknown           Pertinent mammograms are reviewed under the imaging tab.    Review of Systems   Constitutional: Negative for chills and fever.   HENT: Negative for congestion, ear pain, hearing loss and sore throat.    Eyes: Negative for pain and visual disturbance.   Respiratory: Negative for cough and shortness of breath.    Cardiovascular: Negative for chest pain, palpitations and peripheral edema.   Gastrointestinal: Positive for heartburn. Negative for abdominal pain, constipation, diarrhea, hematochezia and nausea.   Breasts:  Negative for tenderness, breast mass and discharge.   Genitourinary: Negative for dysuria, frequency, genital sores, hematuria, pelvic pain, urgency, vaginal bleeding and vaginal discharge.   Musculoskeletal: Negative for arthralgias, joint swelling and myalgias.   Skin: Negative for rash.  "  Neurological: Negative for dizziness, weakness, headaches and paresthesias.   Psychiatric/Behavioral: Negative for mood changes. The patient is not nervous/anxious.          OBJECTIVE:   /70   Pulse 101   Temp 96.9  F (36.1  C) (Temporal)   Resp 16   Ht 1.64 m (5' 4.57\")   Wt 53.1 kg (117 lb)   SpO2 99%   Breastfeeding No   BMI 19.73 kg/m   Estimated body mass index is 19.73 kg/m  as calculated from the following:    Height as of this encounter: 1.64 m (5' 4.57\").    Weight as of this encounter: 53.1 kg (117 lb).  Physical Exam  GENERAL APPEARANCE: healthy, alert and no distress  EYES: Eyes grossly normal to inspection, PERRL and conjunctivae and sclerae normal  HENT: ear canals and TM's normal, nose and mouth without ulcers or lesions, oropharynx clear and oral mucous membranes moist  NECK: no adenopathy, no asymmetry, masses, or scars and thyroid normal to palpation  RESP: lungs clear to auscultation - no rales, rhonchi or wheezes  BREAST: normal without masses, tenderness or nipple discharge and no palpable axillary masses or adenopathy  CV: regular rate and rhythm, normal S1 S2, no S3 or S4, no murmur, click or rub, no peripheral edema and peripheral pulses strong  ABDOMEN: soft, nontender, no hepatosplenomegaly, no masses and bowel sounds normal  MS: no musculoskeletal defects are noted and gait is age appropriate without ataxia  SKIN: no suspicious lesions or rashes  Cherry angiomas  Moles and freckles  NEURO: Normal strength and tone, sensory exam grossly normal, mentation intact and speech normal  PSYCH: mentation appears normal and affect normal/bright        ASSESSMENT / PLAN:   Beena was seen today for physical.    Diagnoses and all orders for this visit:    Encounter for Medicare annual wellness exam  Preventive health counseling was also done.  Colonoscopy was done in 2017  Mammogram on December 1, 2021    Multiple gastric polyps  -     sucralfate (CARAFATE) 1 GM tablet; Take 1 tablet " (1 g) by mouth 2 times daily as needed  Patient has history of multiple gastric polyps  She still has symptoms of acid reflux  She is reluctant to take PPI as she says it will make her gastric polyps worse  She is on Pepcid but that is not controlling her symptoms  I told her that I will discuss this with our GI doctor  I spoke to her GI doctor Dr. Cueva  He said if patient is having symptoms of acid reflux it is okay to take PPI  He also suggested Carafate if needed  At this point controlling the acid reflux is very important otherwise she may end up with other complications  She has an appointment to see GI doctor in February  Advised to keep appointment  Discussed with the patient  She is retired RN.  She requested Carafate and she agreed to take Nexium to control her symptoms    Gastroesophageal reflux disease without esophagitis  -     sucralfate (CARAFATE) 1 GM tablet; Take 1 tablet (1 g) by mouth 2 times daily as needed  See the note above  Endoscopy results from 2017's were reviewed    Epigastric pain  -     US Abdomen Complete; Future  She also sometimes feel pain when she eats  Not all the time  Ultrasound is ordered to make sure there is no other reason    Age-related osteoporosis without current pathological fracture  Discussed osteoporosis and its management  Due to her history of gastric polyp and acid reflux oral bisphosphonates are not a good option  Discussed the importance of taking adequate calcium and vitamin D  Discussed Reclast and she agreed  It is ordered  Bone density was done on December 1, 2021 which was reviewed and discussed with the patient    Elevated blood pressure reading without diagnosis of hypertension  Per patient her blood pressure is under excellent control at home  Monitor your blood pressure once a week  at home.  Bring those readings on your next visit.  Notify us if your blood pressure readings consistently stays greater than 140/90.  Per patient she has whitecoat  "syndrome    Numerous moles  -     Adult Dermatology Referral; Future  Referred to dermatology    Atypical chest pain  -     Echocardiogram Exercise Stress; Future  She sometimes feels pain in her jaw and neck which could be related to acid reflux but I want to make sure there is no cardiac etiology  She agreed    Need for vaccination  -     Pneumococcal vaccine 23 valent PPSV23  (Pneumovax) [99550]  -     ADMIN MEDICARE: Pneumococcal Vaccine ()    Other orders  -     REVIEW OF HEALTH MAINTENANCE PROTOCOL ORDERS    I made phone calls discuss all my discussion which was done with Dr. Cueva.    Patient has been advised of split billing requirements and indicates understanding: Yes  COUNSELING:  Reviewed preventive health counseling, as reflected in patient instructions       Regular exercise       Healthy diet/nutrition       Osteoporosis prevention/bone health    Estimated body mass index is 19.73 kg/m  as calculated from the following:    Height as of this encounter: 1.64 m (5' 4.57\").    Weight as of this encounter: 53.1 kg (117 lb).        She reports that she has never smoked. She has never used smokeless tobacco.      Appropriate preventive services were discussed with this patient, including applicable screening as appropriate for cardiovascular disease, diabetes, osteopenia/osteoporosis, and glaucoma.  As appropriate for age/gender, discussed screening for colorectal cancer, prostate cancer, breast cancer, and cervical cancer. Checklist reviewing preventive services available has been given to the patient.    Reviewed patients plan of care and provided an AVS. The Basic Care Plan (routine screening as documented in Health Maintenance) for Beena meets the Care Plan requirement. This Care Plan has been established and reviewed with the Patient.    Counseling Resources:  ATP IV Guidelines  Pooled Cohorts Equation Calculator  Breast Cancer Risk Calculator  Breast Cancer: Medication to Reduce Risk  FRAX Risk " Assessment  ICSI Preventive Guidelines  Dietary Guidelines for Americans, 2010  USDA's MyPlate  ASA Prophylaxis  Lung CA Screening    Tammy Boyer MD  [facility]    Identified Health Risks:

## 2021-12-21 ASSESSMENT — ASTHMA QUESTIONNAIRES: ACT_TOTALSCORE: 24

## 2021-12-28 ENCOUNTER — HOSPITAL ENCOUNTER (OUTPATIENT)
Dept: ULTRASOUND IMAGING | Facility: CLINIC | Age: 66
Discharge: HOME | End: 2021-12-28
Attending: INTERNAL MEDICINE | Admitting: INTERNAL MEDICINE
Payer: MEDICARE

## 2021-12-28 DIAGNOSIS — R10.13 EPIGASTRIC PAIN: ICD-10-CM

## 2021-12-28 PROCEDURE — 76700 US EXAM ABDOM COMPLETE: CPT

## 2022-01-12 ENCOUNTER — HOSPITAL ENCOUNTER (OUTPATIENT)
Dept: CARDIOLOGY | Facility: CLINIC | Age: 67
Discharge: HOME | End: 2022-01-12
Attending: INTERNAL MEDICINE | Admitting: INTERNAL MEDICINE
Payer: MEDICARE

## 2022-01-12 DIAGNOSIS — R07.89 ATYPICAL CHEST PAIN: ICD-10-CM

## 2022-01-12 PROCEDURE — 93350 STRESS TTE ONLY: CPT | Mod: 26 | Performed by: INTERNAL MEDICINE

## 2022-01-12 PROCEDURE — 255N000002 HC RX 255 OP 636: Performed by: INTERNAL MEDICINE

## 2022-01-12 PROCEDURE — C8928 TTE W OR W/O FOL W/CON,STRES: HCPCS

## 2022-01-12 PROCEDURE — 93325 DOPPLER ECHO COLOR FLOW MAPG: CPT | Mod: 26 | Performed by: INTERNAL MEDICINE

## 2022-01-12 PROCEDURE — 93321 DOPPLER ECHO F-UP/LMTD STD: CPT | Mod: 26 | Performed by: INTERNAL MEDICINE

## 2022-01-12 PROCEDURE — 93321 DOPPLER ECHO F-UP/LMTD STD: CPT | Mod: TC

## 2022-01-12 PROCEDURE — 93018 CV STRESS TEST I&R ONLY: CPT | Performed by: INTERNAL MEDICINE

## 2022-01-12 PROCEDURE — 999N000208 ECHO STRESS ECHOCARDIOGRAM

## 2022-01-12 PROCEDURE — 93016 CV STRESS TEST SUPVJ ONLY: CPT | Performed by: INTERNAL MEDICINE

## 2022-01-12 RX ADMIN — HUMAN ALBUMIN MICROSPHERES AND PERFLUTREN 9 ML: 10; .22 INJECTION, SOLUTION INTRAVENOUS at 11:39

## 2022-01-12 NOTE — RESULT ENCOUNTER NOTE
Araceli Hargrove,    This is to inform you regarding your test result.    Your stress Echocardiogram was normal.    Sincerely,      Dr.Nasima Merlin MD,FACP

## 2022-02-03 ENCOUNTER — TELEPHONE (OUTPATIENT)
Dept: GASTROENTEROLOGY | Facility: CLINIC | Age: 67
End: 2022-02-03

## 2022-02-03 ENCOUNTER — OFFICE VISIT (OUTPATIENT)
Dept: GASTROENTEROLOGY | Facility: CLINIC | Age: 67
End: 2022-02-03
Attending: PHYSICIAN ASSISTANT
Payer: MEDICARE

## 2022-02-03 VITALS
HEART RATE: 95 BPM | SYSTOLIC BLOOD PRESSURE: 159 MMHG | OXYGEN SATURATION: 99 % | WEIGHT: 120 LBS | DIASTOLIC BLOOD PRESSURE: 74 MMHG | BODY MASS INDEX: 20.49 KG/M2 | HEIGHT: 64 IN

## 2022-02-03 DIAGNOSIS — D13.1 BENIGN FUNDIC GLAND POLYPS OF STOMACH: Primary | ICD-10-CM

## 2022-02-03 PROCEDURE — 99204 OFFICE O/P NEW MOD 45 MIN: CPT | Performed by: INTERNAL MEDICINE

## 2022-02-03 RX ORDER — ESOMEPRAZOLE MAGNESIUM 40 MG/1
20 CAPSULE, DELAYED RELEASE ORAL
COMMUNITY
Start: 2021-12-20 | End: 2022-03-17

## 2022-02-03 ASSESSMENT — PAIN SCALES - GENERAL: PAINLEVEL: MILD PAIN (2)

## 2022-02-03 ASSESSMENT — MIFFLIN-ST. JEOR: SCORE: 1064.32

## 2022-02-03 NOTE — PROGRESS NOTES
"  GASTROENTEROLOGY CONSULTATION       Site of Visit: [address]     Provider: Dereje Kahn MD    PCP: Tammy Boyer    Chief Complaint:  chief complaint    Assessment:  Chronic Heartburn  Multiple Cystic Fundic Gland Polyps     Recommendations:  EGD at [facility]   Return to GI Clinic in [place] to review your progress.     History of Present Illness:   Extremely pleasant 66 year old with a history of \"heartburn\" since August after a trip to [place].    The patient took Nexium 20 mg dose.  She stopped after a month and symptoms returned   Heartburn wouldn't stop.  Patient went on 40 mg daily.    She is worried about having to take Nexium 40 mg.  She was in [place] for a few years then moved back here.    Heartburn came back.  Did well with Pepcid complete.       EGD in 2017 showed \" > 100 cystic fundic gland polyps\"     Symptoms  \"Regular heartburn feels burning in her chest radiating up her chest.  Gets an acid feeling.  Gets pain into her back.    Rolaids with simethicone.   Had IBS.  Having some abdominal pain and this improved.    She has been taking 40 mg Nexium in and Pepcid at night.  She felt better.       She worries her is that she is used to it and that something bad is happening.    Takes it there again.  Doesn't have to take many TUMS to fix it.      Once in a while gets a strange pain, cramping feeling up the back of her throat into her jaw, then she belches gets better.     She had her heart checked and it was ok.  Doesn't usually happen more than once a week.      Doesn't get diarrhea.   Pretty much and anxious.    Went back to [facility] with the [employer]map, paid attention to that again.     She lost a few pains.       No regurgitation.    Super sensitive-  GI back in [place] then he took 5 mg of Nortriptyline.     and her .    Colonoscopy 2017 no polyps.    Negative.  Wait 10 years.  Has multiple repeat colonoscopy.      Current Outpatient Medications   Medication Sig Dispense Refill     " "albuterol (PROAIR HFA/PROVENTIL HFA/VENTOLIN HFA) 108 (90 Base) MCG/ACT inhaler TAKE 2 PUFFS BY MOUTH EVERY 4 HOURS AS NEEDED       ALPRAZolam (XANAX) 0.5 MG tablet        calcium carbonate (TUMS) 500 MG chewable tablet Take 1 chew tab by mouth 2 times daily as needed for heartburn       Calcium Citrate-Vitamin D (CALCIUM + D PO) 750 mg Lithothermion       cholecalciferol 50 MCG (2000 UT) CAPS        citalopram (CELEXA) 10 MG tablet TAKE 1.5 TABLETS BY MOUTH DAILY 45 tablet 1     esomeprazole (NEXIUM) 40 MG DR capsule        famotidine (PEPCID) 20 MG tablet 60 mg Taking 40-60 mg daily       fluticasone (FLOVENT HFA) 110 MCG/ACT inhaler        multivitamin  with lutein (OCUVITE WITH LUTEIN) CAPS per capsule Take 1 capsule by mouth daily       Probiotic Product (PROBIOTIC DAILY) CAPS Dr. Barahona       sucralfate (CARAFATE) 1 GM tablet Take 1 tablet (1 g) by mouth 2 times daily as needed (Patient not taking: Reported on 2/3/2022) 60 tablet 1       Past Surgical History:   Procedure Laterality Date     COLONOSCOPY  7/17/2017       Past Medical History:   Diagnosis Date     Uncomplicated asthma        Family History   Problem Relation Age of Onset     Hyperlipidemia Mother      Osteoporosis Mother         reports that she has never smoked. She has never used smokeless tobacco. She reports previous alcohol use. She reports that she does not use drugs.         Physical Exam:   BP (!) 159/74 (BP Location: Right arm, Patient Position: Sitting, Cuff Size: Adult Regular)   Pulse 95   Ht 1.626 m (5' 4\")   Wt 54.4 kg (120 lb)   SpO2 99%   BMI 20.60 kg/m    Wt:   Wt Readings from Last 2 Encounters:   02/03/22 54.4 kg (120 lb)   12/20/21 53.1 kg (117 lb)      Constitutional: cooperative, pleasant, not dyspneic/diaphoretic, no acute distress  Eyes: Sclera anicteric/injected  Ears/nose/mouth/throat: Normal oropharynx without ulcers or exudate, mucus membranes moist, hearing intact  Neck: supple, thyroid normal size  CV: No " edema  Respiratory: Unlabored breathing  Lymph: No axillary, submandibular, supraclavicular or inguinal lymphadenopathy  Abd: Nondistended, +bs, no hepatosplenomegaly, nontender, no peritoneal signs  Skin: warm, perfused, no jaundice  Neuro: AAO x 3, No asterixis  Psych: Normal affect  MSK: Normal gait    Wt Readings from Last 2 Encounters:   02/03/22 54.4 kg (120 lb)   12/20/21 53.1 kg (117 lb)

## 2022-02-03 NOTE — TELEPHONE ENCOUNTER
Screening Questions  Blue=prep questions Red=location Green=sedation   1. Are you active on mychart? Y    2. What insurance is in the chart? MEDICARE BCBS     3.  Ordering/Referring Provider: Dereje Kahn    4. BMI 20.3, If greater than 40 review exclusion criteria also will need EXTENDED PREP    5.  Respiratory Screening (If yes to any of the following HOSPITAL setting only):     Do you use daily home oxygen? N  Do you have mod to severe Obstructive Sleep Apnea? N (can be seen at [facility] or hospital setting)    Do you have Pulmonary Hypertension? N   Do you have UNCONTROLLED asthma? N    6. Have you had a heart or lung transplant? N  (If yes, please review exclusion criteria)    7. Are you currently on dialysis?N  (If yes, schedule in HOSPITAL setting only)(If yes, please send Golytely prep)    8. Do you have chronic kidney disease? N (If yes, please send Golytely prep)    9. Have you had a stroke or Transient ischemic attack (TIA) within 6 months? N (If yes, do not schedule at [facility])    10. In the past 6 months, have you had any heart related issues including cardiomyopathy or heart attack? N (If yes, please review exclusion criteria)           If yes, did it require cardiac stenting or other implantable device?  (If yes, please review exclusion criteria)      11. Do you have any implantable devices in your body (pacemaker, defib, LVAD)? N (If yes, schedule at [facility])    12. Do you take nitroglycerin? If yes, how often? N (if yes, schedule at HOSPITAL setting)    13. Are you currently taking any blood thinners?N (If yes- inform patient to follow up with PCP or provider for follow up instructions)     14. Are you a diabetic? N (If yes, please send Golytely prep)    15. (Females) Are you currently pregnant?   If yes, how many weeks?      16. Are you taking any prescription pain medications on a routine schedule? N If yes, MAC sedation and patient will need EXTENDED PREP.    17. Do you have any chemical dependencies such as  alcohol, street drugs, or methadone? N If yes, MAC sedation     18. Do you have any history of post-traumatic stress syndrome, severe anxiety or history of psychosis? N  If yes, MAC sedation.     19. Do you transfer independently? Y    20.  Do you have any issues with constipation?    If yes, pt will need EXTENDED PREP     21. Preferred Pharmacy for Pre Prescription [id number] - [place], [place] - [address] [facility]    Scheduling Details  Which Colonoscopy Prep was Sent?:   Type of Procedure Scheduled: EGD  Surgeon: ROCÍO Kahn  Date of Procedure: 2/22/22  Location:   Caller (Please ask for phone number if not scheduled by patient): Beena Diaz      Sedation Type: CS  Conscious Sedation- Needs  for 6 hours after the procedure  MAC/General-Needs  for 24 hours after procedure    Pre-op Required at [facility] [place] [place] and OR for MAC sedation:   (if yes advise patient they will need a pre-op prior to procedure)      Informed patient they will need an adult  y  Cannot take any type of public or medical transportation alone    Pre-Procedure Covid test to be completed at [facility] or Externally:  2/18/22    Confirmed Nurse will call to complete assessment y    Additional comments:  ([facility] PATIENTS NEED EXTENDED PREP)

## 2022-02-03 NOTE — LETTER
"    2/3/2022         RE: Beena Diaz  [address]  [facility] [address]        Dear Colleague,    Thank you for referring your patient, Beena Diaz, to the [facility] [place]. Please see a copy of my visit note below.      GASTROENTEROLOGY CONSULTATION       Site of Visit: [address]     Provider: Dereje Kahn MD    PCP: Tammy Boyer    Chief Complaint:  chief complaint    Assessment:  Chronic Heartburn  Multiple Cystic Fundic Gland Polyps     Recommendations:  EGD at [facility]   Return to GI Clinic in PRN to review your progress.     History of Present Illness:   Extremely pleasant 66 year old with a history of \"heartburn\" since August after a trip to [place].    The patient took Nexium 20 mg dose.  She stopped after a month and symptoms returned   Heartburn wouldn't stop.  Patient went on 40 mg daily.    She is worried about having to take Nexium 40 mg.  She was in [place] for a few years then moved back here.    Heartburn came back.  Did well with Pepcid complete.       EGD in 2017 showed \" > 100 cystic fundic gland polyps\"     Symptoms  \"Regular heartburn feels burning in her chest radiating up her chest.  Gets an acid feeling.  Gets pain into her back.    Rolaids with simethicone.   Had IBS.  Having some abdominal pain and this improved.    She has been taking 40 mg Nexium in and Pepcid at night.  She felt better.       She worries her is that she is used to it and that something bad is happening.    Takes it there again.  Doesn't have to take many TUMS to fix it.      Once in a while gets a strange pain, cramping feeling up the back of her throat into her jaw, then she belches gets better.     She had her heart checked and it was ok.  Doesn't usually happen more than once a week.      Doesn't get diarrhea.   Pretty much and anxious.    Went back to [facility] with the [employer], paid attention to that again.     She lost a few pains.       No regurgitation.    Super sensitive-  " "GI back in [place] then he took 5 mg of Nortriptyline.     and her .    Colonoscopy 2017 no polyps.    Negative.  Wait 10 years.  Has multiple repeat colonoscopy.      Current Outpatient Medications   Medication Sig Dispense Refill     albuterol (PROAIR HFA/PROVENTIL HFA/VENTOLIN HFA) 108 (90 Base) MCG/ACT inhaler TAKE 2 PUFFS BY MOUTH EVERY 4 HOURS AS NEEDED       ALPRAZolam (XANAX) 0.5 MG tablet        calcium carbonate (TUMS) 500 MG chewable tablet Take 1 chew tab by mouth 2 times daily as needed for heartburn       Calcium Citrate-Vitamin D (CALCIUM + D PO) 750 mg Lithothermion       cholecalciferol 50 MCG (2000 UT) CAPS        citalopram (CELEXA) 10 MG tablet TAKE 1.5 TABLETS BY MOUTH DAILY 45 tablet 1     esomeprazole (NEXIUM) 40 MG DR capsule        famotidine (PEPCID) 20 MG tablet 60 mg Taking 40-60 mg daily       fluticasone (FLOVENT HFA) 110 MCG/ACT inhaler        multivitamin  with lutein (OCUVITE WITH LUTEIN) CAPS per capsule Take 1 capsule by mouth daily       Probiotic Product (PROBIOTIC DAILY) CAPS Dr. Barahona       sucralfate (CARAFATE) 1 GM tablet Take 1 tablet (1 g) by mouth 2 times daily as needed (Patient not taking: Reported on 2/3/2022) 60 tablet 1       Past Surgical History:   Procedure Laterality Date     COLONOSCOPY  7/17/2017       Past Medical History:   Diagnosis Date     Uncomplicated asthma        Family History   Problem Relation Age of Onset     Hyperlipidemia Mother      Osteoporosis Mother         reports that she has never smoked. She has never used smokeless tobacco. She reports previous alcohol use. She reports that she does not use drugs.         Physical Exam:   BP (!) 159/74 (BP Location: Right arm, Patient Position: Sitting, Cuff Size: Adult Regular)   Pulse 95   Ht 1.626 m (5' 4\")   Wt 54.4 kg (120 lb)   SpO2 99%   BMI 20.60 kg/m    Wt:   Wt Readings from Last 2 Encounters:   02/03/22 54.4 kg (120 lb)   12/20/21 53.1 kg (117 lb)      Constitutional: " cooperative, pleasant, not dyspneic/diaphoretic, no acute distress  Eyes: Sclera anicteric/injected  Ears/nose/mouth/throat: Normal oropharynx without ulcers or exudate, mucus membranes moist, hearing intact  Neck: supple, thyroid normal size  CV: No edema  Respiratory: Unlabored breathing  Lymph: No axillary, submandibular, supraclavicular or inguinal lymphadenopathy  Abd: Nondistended, +bs, no hepatosplenomegaly, nontender, no peritoneal signs  Skin: warm, perfused, no jaundice  Neuro: AAO x 3, No asterixis  Psych: Normal affect  MSK: Normal gait    Wt Readings from Last 2 Encounters:   02/03/22 54.4 kg (120 lb)   12/20/21 53.1 kg (117 lb)             Again, thank you for allowing me to participate in the care of your patient.        Sincerely,        Dereje Kahn MD

## 2022-02-03 NOTE — NURSING NOTE
"Chief Complaint   Patient presents with     New Patient       Vitals:    02/03/22 1459   BP: (!) 159/74   BP Location: Right arm   Patient Position: Sitting   Cuff Size: Adult Regular   Pulse: 95   SpO2: 99%   Weight: 54.4 kg (120 lb)   Height: 1.626 m (5' 4\")       Body mass index is 20.6 kg/m .      Katerina Joiner MA    "

## 2022-02-03 NOTE — PATIENT INSTRUCTIONS
It was a pleasure taking care of you today. I've included a brief summary of our discussion and care plan from today's visit below.  Please review this information with your primary care provider.  _______________________________________________________________________    My recommendations are summarized as follows:  EGD at [facility]     Return to GI Clinic in PRN to review your progress.     If you need any follow-up appointments, please use the following phone numbers below.    To schedule or reschedule a follow-up GI appointment, call [phone] option 1    To schedule your endoscopy procedure, call [phone] option 2    To schedule imaging, please call [phone]     To schedule your lab appointment at [facility] 1st floor lab call [phone]. Call your [place] lab directly if it is not [facility]. If you use a non-[place] lab, please let us know where to fax your lab order (call Rowena at [phone]).      _______________________________________________________________________    Please be in touch if there are any further questions that arise following today's visit.  There are multiple ways to contact your gastroenterology care team.      During business hours, you may reach your gastroenterology RN Care Coordinator, Rowena Anna, at [phone].      You can always send a secure message through CitizenShipper. CitizenShipper messages are answered by your nurse or doctor typically within 24 hours. Please allow extra time on weekends and holidays.     What is CitizenShipper?  CitizenShipper is a secure way for you to access all of your healthcare records from the [facility].  It is a web based computer program, so you can sign on to it from any location.  It also allows you to send secure messages to your care team.  I recommend signing up for CitizenShipper access if you have not already done so and are comfortable with using a computer.     For urgent/emergent questions after business hours, you  may reach the on-call GI Fellow by contacting the [facility]  at [phone].     How will I get the results of any tests ordered?    You will receive all of your results.  If you have signed up for [employer], any tests ordered at your visit will be available to you after your physician reviews them.  Typically this takes 1-2 weeks.  If there are urgent results that require a change in your care plan, your physician or nurse will call you to discuss the next steps.      Thank you for choosing [facility]!       Sincerely,    Dereje Kahn MD  [facility]  Division of Gastroenterology

## 2022-02-04 ENCOUNTER — TELEPHONE (OUTPATIENT)
Dept: GASTROENTEROLOGY | Facility: CLINIC | Age: 67
End: 2022-02-04
Payer: MEDICARE

## 2022-02-04 NOTE — TELEPHONE ENCOUNTER
In review of 2/3/22 office visit recommendations from Dr. Kahn, patient testing/care coordination includes:    EGD at [facility] (sending her a MyChart with scheduling info)  March appointment as already scheduled    Will assist patient as able in scheduling recommended follow-up.

## 2022-02-05 DIAGNOSIS — Z11.59 ENCOUNTER FOR SCREENING FOR OTHER VIRAL DISEASES: Primary | ICD-10-CM

## 2022-02-11 DIAGNOSIS — F41.1 GENERALIZED ANXIETY DISORDER: ICD-10-CM

## 2022-02-12 NOTE — TELEPHONE ENCOUNTER
Disp Refills Start End DEVONTE   citalopram (CELEXA) 10 MG tablet 45 tablet 1 2/7/2022  No   Sig: TAKE 1 AND 1/2 TABLETS BY MOUTH EVERY DAY     RX for Citalopram sent to pharmacy 2/7/22, pharmacy is requesting new RX for a 90 day supply (not pended)

## 2022-02-15 RX ORDER — CITALOPRAM HYDROBROMIDE 10 MG/1
TABLET ORAL
Qty: 45 TABLET | Refills: 1 | Status: SHIPPED | OUTPATIENT
Start: 2022-02-15 | End: 2022-03-04

## 2022-02-19 ENCOUNTER — LAB (OUTPATIENT)
Dept: URGENT CARE | Facility: URGENT CARE | Age: 67
End: 2022-02-19
Payer: MEDICARE

## 2022-02-19 DIAGNOSIS — Z11.59 ENCOUNTER FOR SCREENING FOR OTHER VIRAL DISEASES: ICD-10-CM

## 2022-02-19 PROCEDURE — U0005 INFEC AGEN DETEC AMPLI PROBE: HCPCS

## 2022-02-19 PROCEDURE — U0003 INFECTIOUS AGENT DETECTION BY NUCLEIC ACID (DNA OR RNA); SEVERE ACUTE RESPIRATORY SYNDROME CORONAVIRUS 2 (SARS-COV-2) (CORONAVIRUS DISEASE [COVID-19]), AMPLIFIED PROBE TECHNIQUE, MAKING USE OF HIGH THROUGHPUT TECHNOLOGIES AS DESCRIBED BY CMS-2020-01-R: HCPCS

## 2022-02-20 LAB — SARS-COV-2 RNA RESP QL NAA+PROBE: NEGATIVE

## 2022-02-22 ENCOUNTER — HOSPITAL ENCOUNTER (OUTPATIENT)
Facility: CLINIC | Age: 67
Discharge: HOME | End: 2022-02-22
Attending: INTERNAL MEDICINE | Admitting: INTERNAL MEDICINE
Payer: MEDICARE

## 2022-02-22 VITALS
HEART RATE: 75 BPM | RESPIRATION RATE: 9 BRPM | OXYGEN SATURATION: 95 % | DIASTOLIC BLOOD PRESSURE: 65 MMHG | SYSTOLIC BLOOD PRESSURE: 106 MMHG

## 2022-02-22 LAB — UPPER GI ENDOSCOPY: NORMAL

## 2022-02-22 PROCEDURE — 250N000011 HC RX IP 250 OP 636: Performed by: INTERNAL MEDICINE

## 2022-02-22 PROCEDURE — 88305 TISSUE EXAM BY PATHOLOGIST: CPT | Mod: TC | Performed by: INTERNAL MEDICINE

## 2022-02-22 PROCEDURE — 43251 EGD REMOVE LESION SNARE: CPT | Performed by: INTERNAL MEDICINE

## 2022-02-22 PROCEDURE — G0500 MOD SEDAT ENDO SERVICE >5YRS: HCPCS | Performed by: INTERNAL MEDICINE

## 2022-02-22 PROCEDURE — 43239 EGD BIOPSY SINGLE/MULTIPLE: CPT | Performed by: INTERNAL MEDICINE

## 2022-02-22 RX ORDER — FENTANYL CITRATE 50 UG/ML
INJECTION, SOLUTION INTRAMUSCULAR; INTRAVENOUS PRN
Status: COMPLETED | OUTPATIENT
Start: 2022-02-22 | End: 2022-02-22

## 2022-02-22 RX ADMIN — MIDAZOLAM 2 MG: 1 INJECTION INTRAMUSCULAR; INTRAVENOUS at 10:46

## 2022-02-22 RX ADMIN — FENTANYL CITRATE 25 MCG: 50 INJECTION, SOLUTION INTRAMUSCULAR; INTRAVENOUS at 10:51

## 2022-02-22 RX ADMIN — FENTANYL CITRATE 100 MCG: 50 INJECTION, SOLUTION INTRAMUSCULAR; INTRAVENOUS at 10:44

## 2022-02-22 RX ADMIN — MIDAZOLAM 1 MG: 1 INJECTION INTRAMUSCULAR; INTRAVENOUS at 10:51

## 2022-02-22 NOTE — H&P
Beena ALFORD Slee  [id number]  female  66 year old      Reason for procedure/surgery: Heartburn, Gastric Polyps    Patient Active Problem List   Diagnosis     Anxiety     GERD (gastroesophageal reflux disease)     Exercise-induced asthma     Multiple gastric polyps     Elevated fasting glucose     Generalized anxiety disorder     Headache     Hx of adenomatous colonic polyps     Hx of colonoscopy     Irritable bowel syndrome, unspecified type     Post-menopausal     Prediabetes     Elevated blood pressure reading without diagnosis of hypertension     Hyperlipidemia, unspecified hyperlipidemia type     Gastroesophageal reflux disease without esophagitis     Age-related osteoporosis without current pathological fracture       Past Surgical History:    Past Surgical History:   Procedure Laterality Date     COLONOSCOPY  7/17/2017       Past Medical History:   Past Medical History:   Diagnosis Date     Uncomplicated asthma        Social History:   Social History     Tobacco Use     Smoking status: Never Smoker     Smokeless tobacco: Never Used   Substance Use Topics     Alcohol use: Not Currently       Family History:   Family History   Problem Relation Age of Onset     Hyperlipidemia Mother      Osteoporosis Mother        Allergies: No Known Allergies    Active Medications:   No current outpatient medications on file.       Systemic Review:   CONSTITUTIONAL: NEGATIVE for fever, chills, change in weight  ENT/MOUTH: NEGATIVE for ear, mouth and throat problems  RESP: NEGATIVE for significant cough or SOB  CV: NEGATIVE for chest pain, palpitations or peripheral edema    Physical Examination:   Vital Signs: There were no vitals taken for this visit.  GENERAL: healthy, alert and no distress  NECK: no adenopathy, no asymmetry, masses, or scars  RESP: lungs clear to auscultation - no rales, rhonchi or wheezes  CV: regular rate and rhythm, normal S1 S2, no S3 or S4, no murmur, click or rub, no peripheral edema and peripheral pulses  strong  ABDOMEN: soft, nontender, no hepatosplenomegaly, no masses and bowel sounds normal  MS: no gross musculoskeletal defects noted, no edema    ASA: 2    Mallampati Score: 2    Plan: Appropriate to proceed as scheduled.      Dereje Kahn MD  2/22/2022    PCP:  Tammy Boyer

## 2022-02-24 LAB
PATH REPORT.COMMENTS IMP SPEC: NORMAL
PATH REPORT.COMMENTS IMP SPEC: NORMAL
PATH REPORT.FINAL DX SPEC: NORMAL
PATH REPORT.GROSS SPEC: NORMAL
PATH REPORT.MICROSCOPIC SPEC OTHER STN: NORMAL
PATH REPORT.RELEVANT HX SPEC: NORMAL
PHOTO IMAGE: NORMAL

## 2022-02-24 PROCEDURE — 88305 TISSUE EXAM BY PATHOLOGIST: CPT | Mod: 26 | Performed by: PATHOLOGY

## 2022-03-03 ENCOUNTER — VIRTUAL VISIT (OUTPATIENT)
Dept: GASTROENTEROLOGY | Facility: CLINIC | Age: 67
End: 2022-03-03
Payer: MEDICARE

## 2022-03-03 VITALS — HEIGHT: 64 IN | WEIGHT: 120 LBS | BODY MASS INDEX: 20.49 KG/M2

## 2022-03-03 DIAGNOSIS — F41.1 GENERALIZED ANXIETY DISORDER: ICD-10-CM

## 2022-03-03 DIAGNOSIS — K31.7 MULTIPLE GASTRIC POLYPS: ICD-10-CM

## 2022-03-03 DIAGNOSIS — K21.9 GASTROESOPHAGEAL REFLUX DISEASE WITHOUT ESOPHAGITIS: Primary | ICD-10-CM

## 2022-03-03 PROCEDURE — 99213 OFFICE O/P EST LOW 20 MIN: CPT | Mod: 95 | Performed by: INTERNAL MEDICINE

## 2022-03-03 NOTE — PATIENT INSTRUCTIONS
It was a pleasure taking care of you today. I've included a brief summary of our discussion and care plan from today's visit below.  Please review this information with your primary care provider.                                                  Recommendations:   Continue to use PPI as necessary and it is safe to do so.  Follow-up with me as needed       If you need any follow-up appointments, please use the following phone numbers below.    To schedule or reschedule a follow-up GI appointment, call [phone] option 1    To schedule your endoscopy procedure, call [phone] option 2    To schedule imaging, please call [phone]     To schedule your lab appointment at [facility] 1st floor lab call [phone]. Call your [place] lab directly if it is not [facility]. If you use a non-[place] lab, please let us know where to fax your lab order (call Rowena at [phone]).      _______________________________________________________________________    Please be in touch if there are any further questions that arise following today's visit.  There are multiple ways to contact your gastroenterology care team.      During business hours, you may reach your gastroenterology RN Care Coordinator, Rowena Anna, at [phone].      You can always send a secure message through Ivycorp. Ivycorp messages are answered by your nurse or doctor typically within 24 hours. Please allow extra time on weekends and holidays.     What is Ivycorp?  Ivycorp is a secure way for you to access all of your healthcare records from the [facility].  It is a web based computer program, so you can sign on to it from any location.  It also allows you to send secure messages to your care team.  I recommend signing up for Ivycorp access if you have not already done so and are comfortable with using a computer.     For urgent/emergent questions after business hours, you may reach the on-call GI Fellow by  contacting the [facility]  at [phone].     How will I get the results of any tests ordered?    You will receive all of your results.  If you have signed up for [employer] - is now [employer], any tests ordered at your visit will be available to you after your physician reviews them.  Typically this takes 1-2 weeks.  If there are urgent results that require a change in your care plan, your physician or nurse will call you to discuss the next steps.      Thank you for choosing [facility]!       Sincerely,    Dereje Kahn MD  [facility]  Division of Gastroenterology

## 2022-03-03 NOTE — PROGRESS NOTES
Beena is a 66 year old who is being evaluated via a billable video visit.      How would you like to obtain your AVS? MyChart  If the video visit is dropped, the invitation should be resent by: Text to cell phone: [phone]  Will anyone else be joining your video visit? No      Video Start Time: 4:00  Video-Visit Details    Type of service:  Video Visit    Video End Time:4:15    Originating Location (pt. Location): Home    Distant Location (provider location):  [facility]     Platform used for Video Visit: [facility]      GASTROENTEROLOGY CONSULTATION            Provider: Dereje Kahn MD    PCP: Tammy Boyer    Chief Complaint:    Heartburn    Assessment:  Beena  is here in follow-up after her recent upper endoscopy and biopsies.       EGD showed the following:                                                                                 Findings:        The examined esophagus was normal.        The gastroesophageal flap valve was visualized endoscopically and        classified as Hill Grade IV (no fold, wide open lumen, hiatal hernia        present).        A 3 cm hiatal hernia was present.        The examined duodenum was normal.        Multiple 8 to 12 mm pedunculated and sessile polyps with no bleeding and        no stigmata of recent bleeding were found in the gastric body. Several        polyps from the lesser curvature of the gastric body were removed with a        cold snare. Resection and retrieval were complete using a Long net.         Pathology showed the following          Final Diagnosis   Stomach, multiple mucosal polyps, resections:  - Multiple fundic gland mucosal polyps.  - Negative for dysplasia and malignancy,   Electronically signed by Lashae Morales MD on 2/24/2022 at       Beena told me she will probably try to wean off of all her proton pump inhibitors as soon as she can.  She does not want to take the medicine the rest of her life.                       "                                                  Recommendations:   Continue to use PPI as necessary and it is safe to do so.  Follow-up with me as needed       Current Outpatient Medications   Medication Sig Dispense Refill     albuterol (PROAIR HFA/PROVENTIL HFA/VENTOLIN HFA) 108 (90 Base) MCG/ACT inhaler TAKE 2 PUFFS BY MOUTH EVERY 4 HOURS AS NEEDED       ALPRAZolam (XANAX) 0.5 MG tablet        calcium carbonate (TUMS) 500 MG chewable tablet Take 1 chew tab by mouth 2 times daily as needed for heartburn       Calcium Citrate-Vitamin D (CALCIUM + D PO) 750 mg Lithothermion       cholecalciferol 50 MCG (2000 UT) CAPS        esomeprazole (NEXIUM) 40 MG DR capsule 20 mg        famotidine (PEPCID) 20 MG tablet 60 mg Taking 40-60 mg daily       fluticasone (FLOVENT HFA) 110 MCG/ACT inhaler        multivitamin  with lutein (OCUVITE WITH LUTEIN) CAPS per capsule Take 1 capsule by mouth daily       Probiotic Product (PROBIOTIC DAILY) CAPS Dr. Barahona       citalopram (CELEXA) 10 MG tablet TAKE 1.5 TABLETS BY MOUTH EVERY  tablet 0     sucralfate (CARAFATE) 1 GM tablet Take 1 tablet (1 g) by mouth 2 times daily as needed (Patient not taking: Reported on 2/3/2022) 60 tablet 1       Past Surgical History:   Procedure Laterality Date     COLONOSCOPY  7/17/2017     ESOPHAGOSCOPY, GASTROSCOPY, DUODENOSCOPY (EGD), COMBINED N/A 2/22/2022    Procedure: ESOPHAGOGASTRODUODENOSCOPY, WITH BIOPSY;  Surgeon: Dereje Kahn MD;  Location: [facility]       Past Medical History:   Diagnosis Date     Uncomplicated asthma        Family History   Problem Relation Age of Onset     Hyperlipidemia Mother      Osteoporosis Mother         reports that she has never smoked. She has never used smokeless tobacco. She reports previous alcohol use. She reports that she does not use drugs.         Physical Exam:   Ht 1.626 m (5' 4\")   Wt 54.4 kg (120 lb)   BMI 20.60 kg/m    Wt:   Wt Readings from Last 2 Encounters:   03/03/22 54.4 kg (120 lb) "   02/03/22 54.4 kg (120 lb)      Constitutional: cooperative, pleasant, not dyspneic/diaphoretic, no acute distress      Wt Readings from Last 2 Encounters:   03/03/22 54.4 kg (120 lb)   02/03/22 54.4 kg (120 lb)

## 2022-03-03 NOTE — NURSING NOTE
"Chief Complaint   Patient presents with     Follow Up       Vitals:    03/03/22 1610   Weight: 54.4 kg (120 lb)   Height: 1.626 m (5' 4\")       Body mass index is 20.6 kg/m .      Katerina Joiner MA    "

## 2022-03-04 RX ORDER — CITALOPRAM HYDROBROMIDE 10 MG/1
TABLET ORAL
Qty: 135 TABLET | Refills: 0 | Status: SHIPPED | OUTPATIENT
Start: 2022-03-04 | End: 2022-06-02

## 2022-03-04 NOTE — TELEPHONE ENCOUNTER
Appointments in Next Year    Mar 17, 2022 10:00 AM  (Arrive by 9:40 AM)  Provider Visit with Tammy Boyer MD  [facility] ([facility] ) [phone]        Last visit 12/20/21 - annual exam     Prescription approved per [facility] Refill Protocol.    Bruce Gerber RN  [facility]

## 2022-03-09 ENCOUNTER — TELEPHONE (OUTPATIENT)
Dept: GASTROENTEROLOGY | Facility: CLINIC | Age: 67
End: 2022-03-09
Payer: MEDICARE

## 2022-03-09 NOTE — TELEPHONE ENCOUNTER
Care coordination in reference to 3/3/22 office visit recommendations by Dr. Kahn is patient-driven/as-needed. No nursing care coordination needed at this time.

## 2022-03-17 ENCOUNTER — VIRTUAL VISIT (OUTPATIENT)
Dept: FAMILY MEDICINE | Facility: CLINIC | Age: 67
End: 2022-03-17
Payer: MEDICARE

## 2022-03-17 DIAGNOSIS — M81.0 AGE-RELATED OSTEOPOROSIS WITHOUT CURRENT PATHOLOGICAL FRACTURE: ICD-10-CM

## 2022-03-17 DIAGNOSIS — R03.0 ELEVATED BLOOD PRESSURE READING WITHOUT DIAGNOSIS OF HYPERTENSION: ICD-10-CM

## 2022-03-17 DIAGNOSIS — K31.7 GASTRIC POLYP: ICD-10-CM

## 2022-03-17 DIAGNOSIS — M54.2 NECK PAIN: Primary | ICD-10-CM

## 2022-03-17 PROCEDURE — 99213 OFFICE O/P EST LOW 20 MIN: CPT | Mod: 95 | Performed by: INTERNAL MEDICINE

## 2022-03-17 RX ORDER — FAMOTIDINE 20 MG/1
20 TABLET, FILM COATED ORAL
COMMUNITY
Start: 2022-03-17 | End: 2022-08-23

## 2022-03-17 NOTE — PATIENT INSTRUCTIONS
Advise to take adequate calcium and vit D and weight bearing exercises  Schedule IV Reclast at your earliest convenience  Monitor your blood pressure once a week  at home.  Bring those readings on your next visit.  Notify us if your blood pressure readings consistently stays greater than 140/90.    Follow-up at the end of the year for annual exam  Seek sooner medical attention if there is any worsening of symptoms or problems.

## 2022-03-17 NOTE — PROGRESS NOTES
Beena is a 66 year old who is being evaluated via a billable video visit.      How would you like to obtain your AVS? MyChart  If the video visit is dropped, the invitation should be resent by: Text to cell phone: [phone]  Will anyone else be joining your video visit? No    Video Start Time: 10:11 AM    Assessment & Plan     Diagnoses and all orders for this visit:    Neck pain  -     Physical Therapy Referral; Future  Patient is having some neck pain and would like physical therapy    Age-related osteoporosis without current pathological fracture  -     Physical Therapy Referral; Future  She has osteoporosis  Taking adequate calcium and vitamin D  Weightbearing exercises  IV Reclast is ordered but patient wants to take care of her dental work first before proceeding with the infusion  Discussed the importance of that  She is unable to take bisphosphonates by mouth due to history of gastric polyps    Elevated blood pressure reading without diagnosis of hypertension  Per patient blood pressure is under excellent control at home and she has been monitoring it regularly    Gastric polyp  Currently she is taking Pepcid at night  She discontinued PPI and never filled Carafate  She had endoscopy done and was seen by GI doctor  Pathology report of the polyp showed benign finding         See Patient Instructions  Patient Instructions   Advise to take adequate calcium and vit D and weight bearing exercises  Schedule IV Reclast at your earliest convenience  Monitor your blood pressure once a week  at home.  Bring those readings on your next visit.  Notify us if your blood pressure readings consistently stays greater than 140/90.    Follow-up at the end of the year for annual exam  Seek sooner medical attention if there is any worsening of symptoms or problems.        No follow-ups on file.    Tammy Boyer MD  [facility]    Nathalie Hargrove is a 66 year old who presents for the following  health issues     History of Present Illness       Reason for visit:  Follow up  Symptom onset:  More than a month  Symptoms include:  Heartburn  Symptom intensity:  Mild  Symptom progression:  Improving  Had these symptoms before:  Yes    She eats 4 or more servings of fruits and vegetables daily.She consumes 0 sweetened beverage(s) daily.She exercises with enough effort to increase her heart rate 30 to 60 minutes per day.  She exercises with enough effort to increase her heart rate 5 days per week.   She is taking medications regularly.           Review of Systems   Constitutional, HEENT, cardiovascular, pulmonary, GI, , musculoskeletal, neuro, skin, endocrine and psych systems are negative, except as otherwise noted.      Objective           Vitals:  No vitals were obtained today due to virtual visit.    Physical Exam   GENERAL: Healthy, alert and no distress  EYES: Eyes grossly normal to inspection.  No discharge or erythema, or obvious scleral/conjunctival abnormalities.  RESP: No audible wheeze, cough, or visible cyanosis.  No visible retractions or increased work of breathing.    SKIN: Visible skin clear. No significant rash, abnormal pigmentation or lesions.  NEURO: Cranial nerves grossly intact.  Mentation and speech appropriate for age.  PSYCH: Mentation appears normal, affect normal/bright, judgement and insight intact, normal speech and appearance well-groomed.            Video-Visit Details    Type of service:  Video Visit    Video End Time:10:20 AM    Originating Location (pt. Location): Home    Distant Location (provider location):  [facility]     Platform used for Video Visit: Ross[employer]     Disclaimer: This note consists of symbols derived from keyboarding, dictation and/or voice recognition software. As a result, there may be errors in the script that have gone undetected. Please consider this when interpreting information found in this chart.

## 2022-03-28 ENCOUNTER — THERAPY VISIT (OUTPATIENT)
Dept: PHYSICAL THERAPY | Facility: CLINIC | Age: 67
End: 2022-03-28
Attending: INTERNAL MEDICINE
Payer: MEDICARE

## 2022-03-28 DIAGNOSIS — M54.2 NECK PAIN: ICD-10-CM

## 2022-03-28 DIAGNOSIS — M81.0 AGE-RELATED OSTEOPOROSIS WITHOUT CURRENT PATHOLOGICAL FRACTURE: ICD-10-CM

## 2022-03-28 PROCEDURE — 97110 THERAPEUTIC EXERCISES: CPT | Mod: GP | Performed by: PHYSICAL THERAPIST

## 2022-03-28 PROCEDURE — 97161 PT EVAL LOW COMPLEX 20 MIN: CPT | Mod: GP | Performed by: PHYSICAL THERAPIST

## 2022-03-28 NOTE — PROGRESS NOTES
[facility]    OUTPATIENT Physical Therapy ORTHOPEDIC EVALUATION  PLAN OF TREATMENT FOR OUTPATIENT REHABILITATION  (COMPLETE FOR INITIAL CLAIMS ONLY)  Patient's Last Name, First Name, M.I.  YOB: 1955  Beena Diaz    Provider s Name:  [facility]   Medical Record No.  [id number]   Start of Care Date:  03/28/22   Onset Date:       Type:     _X__PT   ___OT Medical Diagnosis:    Encounter Diagnoses   Name Primary?     Neck pain      Age-related osteoporosis without current pathological fracture         Treatment Diagnosis:  Cervical neck pain, osteoporosis        Goals:     03/28/22 0500   Body Part   Goals listed below are for Cervical   Goal #1   Goal #1 lifting/carrying   Previous Functional Level No restrictions   Current Functional Level Cannot carry;at shoulder level for;overhead for   Performance level any period of time without inc vertigo symptoms or difficulty   STG [employer] Performance Carry an item weighing   Performance level 5lbs without vertigo symptoms   Rationale for grocery shopping;for meal preparation;for housework such as laundry, emptying garbage, use of ;for yard work such as shoveling snow   Due date 04/27/22   LTG [employer] Carry an item weighing   Performance Level 10lbs no issues or vertigo   Rationale for grocery shopping;for meal preparation;for housework such as laundry, emptying garbage, use of ;for yard work such as shoveling snow   Due date 06/26/22       Therapy Frequency:  1x/week  Predicted Duration of Therapy Intervention:  10 weeks    ANTHONY CRUZ, PT                 I CERTIFY THE NEED FOR THESE SERVICES FURNISHED UNDER        THIS PLAN OF TREATMENT AND WHILE UNDER MY CARE     (Physician attestation of this document indicates review and certification of the therapy plan).                        Certification Date From:  03/28/22   Certification Date To:  06/26/22    Referring Provider:  Tammy Boyer    Initial Assessment        See Epic Evaluation SOC Date: 03/28/22

## 2022-03-28 NOTE — PROGRESS NOTES
Physical Therapy Initial Evaluation - Cervical    Therapist Impression:  Patient presents with signs and symptoms consistent with neck pain and vertigo secondary to chronic degeneration and poor postural awareness. Patient demonstrates impairments including decreased cervical range of motion, flexibility and joint mobility, with poor postural awareness and scapular strength. Patient's functional limitations include sudden motions, carrying something and turning her head fast during daily activities.    Subjective:  Beena Diaz is a 66 year old female. HPI given by patient  Patient's chief complaints: neck pain and tightness, she has been doing a lot of osteoporosis exercises from online, has been doing yoga and stretching - she is getting transient vertigo and had to describe - with fast movement it feels like she is getting dizziness that is a bit alarming - wanting to work on posture and positioning   Condition occurred due to chronic neck pain and tightness, she doesn't feel like she's straight in her posture.  Date of Onset: 3 weeks ago, started after 2 hours in the dentist chair  Location of symptoms is neck symptoms, no headaches  Symptoms other than pain include: achy, burning, nerve tension   Pain dependence on time of day is: doesn't matter for her, only when she is standing and moving.   Pain rating is: 1/10  Symptoms are exacerbated by: sudden motions, carrying something, turning her head and it goes down to her legs     Symptoms are relieved by:  Sitting down, nothing that she can do gets rid of it    Progression of symptoms is that symptoms are:  Improved since it started.    Imaging/Special tests include: DXA 12/2021 IMPRESSION:  1. Prominent osteopenia in both femoral necks.  2. Mild osteoporosis in the upper lumbar spine.      Previous treatments include: chiropractor and massage therapy but stopped with COVID   Patient reports that general health is: good.     Current occupation is:  Retired  Primary job tasks include: staying pretty active    Red flags include: None at this time    Patient's expectations for therapy include: neck and transient vertigo, work on posture    Pertinent medical history includes: Osteoporosis    Past Medical History:   Diagnosis Date     Uncomplicated asthma    Surgical history includes: .  Past Surgical History:   Procedure Laterality Date     COLONOSCOPY  7/17/2017     ESOPHAGOSCOPY, GASTROSCOPY, DUODENOSCOPY (EGD), COMBINED N/A 2/22/2022    Procedure: ESOPHAGOGASTRODUODENOSCOPY, WITH BIOPSY;  Surgeon: Dereje Kahn MD;  Location: [facility]       Current Outpatient Medications:      albuterol (PROAIR HFA/PROVENTIL HFA/VENTOLIN HFA) 108 (90 Base) MCG/ACT inhaler, TAKE 2 PUFFS BY MOUTH EVERY 4 HOURS AS NEEDED, Disp: , Rfl:      ALPRAZolam (XANAX) 0.5 MG tablet, , Disp: , Rfl:      calcium carbonate (TUMS) 500 MG chewable tablet, Take 1 chew tab by mouth 2 times daily as needed for heartburn, Disp: , Rfl:      Calcium Citrate-Vitamin D (CALCIUM + D PO), 750 mg Lithothermion, Disp: , Rfl:      cholecalciferol 50 MCG (2000 UT) CAPS, , Disp: , Rfl:      citalopram (CELEXA) 10 MG tablet, TAKE 1.5 TABLETS BY MOUTH EVERY DAY, Disp: 135 tablet, Rfl: 0     famotidine (PEPCID) 20 MG tablet, Take 1 tablet (20 mg) by mouth nightly as needed, Disp: , Rfl:      famotidine (PEPCID) 20 MG tablet, 60 mg Taking 40-60 mg daily, Disp: , Rfl:      fluticasone (FLOVENT HFA) 110 MCG/ACT inhaler, , Disp: , Rfl:      multivitamin  with lutein (OCUVITE WITH LUTEIN) CAPS per capsule, Take 1 capsule by mouth daily, Disp: , Rfl:      Probiotic Product (PROBIOTIC DAILY) XOCHITL, Dr. Barahona, Disp: , Rfl:     CERVICAL:    Posture: rounded shoulders and forward head posture    Neurological:    Motor Deficit: WFL no increase of symptoms    AROM: (Major, Moderate, Minimal or Nil loss)  Movement Loss Tavares Mod Min Nil Pain   Protrusion        Flexion   40     Retraction        Extension   30     Left Rotation    68     Right Rotation   60     Left Side Bending   50     Right Side bending  42 tight on L        Palpation: TTP at R>L cervical paraspinals, levators, UT, suboccipitals  Joint Mobility: limited R>L  Flexibility: limited R>L UT, levators, pectorals    Assessment/Plan:    Patient is a 66 year old female with cervical complaints.    Patient has the following significant findings with corresponding treatment plan.                Diagnosis 1:  Cervical neck pain, poor posture  Pain -  hot/cold therapy, US, electric stimulation, mechanical traction, manual therapy and splint/taping/bracing/orthotics  Decreased ROM/flexibility - manual therapy and therapeutic exercise  Decreased joint mobility - manual therapy and therapeutic exercise  Decreased strength - therapeutic exercise and therapeutic activities  Diagnosis 2:  Osteoporosis   Pain -  hot/cold therapy, manual therapy and splint/taping/bracing/orthotics  Decreased ROM/flexibility - manual therapy and therapeutic exercise  Decreased joint mobility - manual therapy and therapeutic exercise  Decreased strength - therapeutic exercise and therapeutic activities    Therapy Evaluation Codes: '  Cumulative Therapy Evaluation is: Low complexity.    Previous and current functional limitations:  (See Goal Flow Sheet for this information)    Short term and Long term goals: (See Goal Flow Sheet for this information)     Communication ability:  Patient appears to be able to clearly communicate and understand verbal and written communication and follow directions correctly.  Treatment Explanation - The following has been discussed with the patient:   RX ordered/plan of care  Anticipated outcomes  Possible risks and side effects  This patient would benefit from PT intervention to resume normal activities.   Rehab potential is good.    Frequency:  1 X week, once daily  Duration:  for 4 weeks tapering to 2 X a month over 6 weeks  Discharge Plan:  Achieve all LTG.  Independent in home  treatment program.  Reach maximal therapeutic benefit.    Please refer to the daily flowsheet for treatment today, total treatment time and time spent performing 1:1 timed codes.

## 2022-04-06 ENCOUNTER — THERAPY VISIT (OUTPATIENT)
Dept: PHYSICAL THERAPY | Facility: CLINIC | Age: 67
End: 2022-04-06
Payer: MEDICARE

## 2022-04-06 DIAGNOSIS — M54.2 NECK PAIN: Primary | ICD-10-CM

## 2022-04-06 DIAGNOSIS — M81.0 AGE-RELATED OSTEOPOROSIS WITHOUT CURRENT PATHOLOGICAL FRACTURE: ICD-10-CM

## 2022-04-06 PROCEDURE — 97140 MANUAL THERAPY 1/> REGIONS: CPT | Mod: GP | Performed by: PHYSICAL THERAPIST

## 2022-04-06 PROCEDURE — 97110 THERAPEUTIC EXERCISES: CPT | Mod: GP | Performed by: PHYSICAL THERAPIST

## 2022-04-13 ENCOUNTER — THERAPY VISIT (OUTPATIENT)
Dept: PHYSICAL THERAPY | Facility: CLINIC | Age: 67
End: 2022-04-13
Payer: MEDICARE

## 2022-04-13 DIAGNOSIS — M54.2 NECK PAIN: Primary | ICD-10-CM

## 2022-04-13 PROCEDURE — 97110 THERAPEUTIC EXERCISES: CPT | Mod: GP | Performed by: PHYSICAL THERAPIST

## 2022-04-13 PROCEDURE — 97140 MANUAL THERAPY 1/> REGIONS: CPT | Mod: GP | Performed by: PHYSICAL THERAPIST

## 2022-04-21 ENCOUNTER — THERAPY VISIT (OUTPATIENT)
Dept: PHYSICAL THERAPY | Facility: CLINIC | Age: 67
End: 2022-04-21
Payer: MEDICARE

## 2022-04-21 DIAGNOSIS — M54.2 NECK PAIN: Primary | ICD-10-CM

## 2022-04-21 DIAGNOSIS — M81.0 AGE-RELATED OSTEOPOROSIS WITHOUT CURRENT PATHOLOGICAL FRACTURE: ICD-10-CM

## 2022-04-21 PROCEDURE — 97110 THERAPEUTIC EXERCISES: CPT | Mod: GP | Performed by: PHYSICAL THERAPIST

## 2022-04-21 PROCEDURE — 97140 MANUAL THERAPY 1/> REGIONS: CPT | Mod: GP | Performed by: PHYSICAL THERAPIST

## 2022-05-16 ENCOUNTER — THERAPY VISIT (OUTPATIENT)
Dept: PHYSICAL THERAPY | Facility: CLINIC | Age: 67
End: 2022-05-16
Payer: MEDICARE

## 2022-05-16 DIAGNOSIS — M54.2 NECK PAIN: Primary | ICD-10-CM

## 2022-05-16 DIAGNOSIS — M81.0 AGE-RELATED OSTEOPOROSIS WITHOUT CURRENT PATHOLOGICAL FRACTURE: ICD-10-CM

## 2022-05-16 PROCEDURE — 97140 MANUAL THERAPY 1/> REGIONS: CPT | Mod: GP | Performed by: PHYSICAL THERAPIST

## 2022-05-16 PROCEDURE — 97110 THERAPEUTIC EXERCISES: CPT | Mod: GP | Performed by: PHYSICAL THERAPIST

## 2022-06-01 DIAGNOSIS — F41.1 GENERALIZED ANXIETY DISORDER: ICD-10-CM

## 2022-06-02 ENCOUNTER — THERAPY VISIT (OUTPATIENT)
Dept: PHYSICAL THERAPY | Facility: CLINIC | Age: 67
End: 2022-06-02
Payer: MEDICARE

## 2022-06-02 ENCOUNTER — LAB (OUTPATIENT)
Dept: INFUSION THERAPY | Facility: CLINIC | Age: 67
End: 2022-06-02
Attending: INTERNAL MEDICINE
Payer: MEDICARE

## 2022-06-02 DIAGNOSIS — K21.9 GASTROESOPHAGEAL REFLUX DISEASE WITHOUT ESOPHAGITIS: Primary | ICD-10-CM

## 2022-06-02 DIAGNOSIS — M54.2 NECK PAIN: Primary | ICD-10-CM

## 2022-06-02 DIAGNOSIS — M81.0 AGE-RELATED OSTEOPOROSIS WITHOUT CURRENT PATHOLOGICAL FRACTURE: ICD-10-CM

## 2022-06-02 DIAGNOSIS — K31.7 MULTIPLE GASTRIC POLYPS: ICD-10-CM

## 2022-06-02 LAB
ALBUMIN SERPL-MCNC: 3.6 G/DL (ref 3.4–5)
CALCIUM SERPL-MCNC: 9.3 MG/DL (ref 8.5–10.1)
CREAT SERPL-MCNC: 0.67 MG/DL (ref 0.52–1.04)
GFR SERPL CREATININE-BSD FRML MDRD: >90 ML/MIN/1.73M2
HOLD SPECIMEN: NORMAL

## 2022-06-02 PROCEDURE — 82565 ASSAY OF CREATININE: CPT | Performed by: INTERNAL MEDICINE

## 2022-06-02 PROCEDURE — 97140 MANUAL THERAPY 1/> REGIONS: CPT | Mod: GP | Performed by: PHYSICAL THERAPIST

## 2022-06-02 PROCEDURE — 82040 ASSAY OF SERUM ALBUMIN: CPT | Performed by: INTERNAL MEDICINE

## 2022-06-02 PROCEDURE — 36415 COLL VENOUS BLD VENIPUNCTURE: CPT | Performed by: INTERNAL MEDICINE

## 2022-06-02 PROCEDURE — 82310 ASSAY OF CALCIUM: CPT | Performed by: INTERNAL MEDICINE

## 2022-06-02 RX ORDER — MEPERIDINE HYDROCHLORIDE 25 MG/ML
25 INJECTION INTRAMUSCULAR; INTRAVENOUS; SUBCUTANEOUS EVERY 30 MIN PRN
Status: CANCELLED | OUTPATIENT
Start: 2022-06-02

## 2022-06-02 RX ORDER — ALBUTEROL SULFATE 90 UG/1
1-2 AEROSOL, METERED RESPIRATORY (INHALATION)
Status: CANCELLED
Start: 2022-06-02

## 2022-06-02 RX ORDER — HEPARIN SODIUM (PORCINE) LOCK FLUSH IV SOLN 100 UNIT/ML 100 UNIT/ML
5 SOLUTION INTRAVENOUS
Status: CANCELLED | OUTPATIENT
Start: 2022-06-02

## 2022-06-02 RX ORDER — ALBUTEROL SULFATE 0.83 MG/ML
2.5 SOLUTION RESPIRATORY (INHALATION)
Status: CANCELLED | OUTPATIENT
Start: 2022-06-02

## 2022-06-02 RX ORDER — DIPHENHYDRAMINE HYDROCHLORIDE 50 MG/ML
50 INJECTION INTRAMUSCULAR; INTRAVENOUS
Status: CANCELLED
Start: 2022-06-02

## 2022-06-02 RX ORDER — EPINEPHRINE 1 MG/ML
0.3 INJECTION, SOLUTION INTRAMUSCULAR; SUBCUTANEOUS EVERY 5 MIN PRN
Status: CANCELLED | OUTPATIENT
Start: 2022-06-02

## 2022-06-02 RX ORDER — ZOLEDRONIC ACID 5 MG/100ML
5 INJECTION, SOLUTION INTRAVENOUS ONCE
Status: CANCELLED
Start: 2022-06-02 | End: 2022-06-02

## 2022-06-02 RX ORDER — NALOXONE HYDROCHLORIDE 0.4 MG/ML
0.2 INJECTION, SOLUTION INTRAMUSCULAR; INTRAVENOUS; SUBCUTANEOUS
Status: CANCELLED | OUTPATIENT
Start: 2022-06-02

## 2022-06-02 RX ORDER — CITALOPRAM HYDROBROMIDE 10 MG/1
TABLET ORAL
Qty: 135 TABLET | Refills: 2 | Status: SHIPPED | OUTPATIENT
Start: 2022-06-02 | End: 2022-12-16

## 2022-06-02 RX ORDER — METHYLPREDNISOLONE SODIUM SUCCINATE 125 MG/2ML
125 INJECTION, POWDER, LYOPHILIZED, FOR SOLUTION INTRAMUSCULAR; INTRAVENOUS
Status: CANCELLED
Start: 2022-06-02

## 2022-06-02 RX ORDER — HEPARIN SODIUM,PORCINE 10 UNIT/ML
5 VIAL (ML) INTRAVENOUS
Status: CANCELLED | OUTPATIENT
Start: 2022-06-02

## 2022-06-02 NOTE — PROGRESS NOTES
Medical Assistant Note:  Beena Diaz presents today for lab draw.    Patient seen by provider today: No.   present during visit today: Not Applicable.    Concerns: No Concerns.    Procedure:  Lab draw site: RAC, Needle type: BF, Gauge: 21.   gauze and coban applied  Post Assessment:  Labs drawn without difficulty: Yes.    Discharge Plan:  Departure Mode: Ambulatory.    Face to Face Time: 5.    Indigo Collins CMA

## 2022-06-03 NOTE — RESULT ENCOUNTER NOTE
Araceli Hargrove,    This is to inform you regarding your test result.    Creatinine which is kidney function is fine  Calcium level is normal  Albumin level is fine    Sincerely,      Dr.Nasima Merlin MD,FACP

## 2022-06-06 ENCOUNTER — INFUSION THERAPY VISIT (OUTPATIENT)
Dept: INFUSION THERAPY | Facility: CLINIC | Age: 67
End: 2022-06-06
Attending: INTERNAL MEDICINE
Payer: MEDICARE

## 2022-06-06 VITALS
TEMPERATURE: 98 F | SYSTOLIC BLOOD PRESSURE: 132 MMHG | DIASTOLIC BLOOD PRESSURE: 81 MMHG | RESPIRATION RATE: 16 BRPM | HEART RATE: 81 BPM

## 2022-06-06 DIAGNOSIS — K31.7 MULTIPLE GASTRIC POLYPS: Primary | ICD-10-CM

## 2022-06-06 DIAGNOSIS — M81.0 AGE-RELATED OSTEOPOROSIS WITHOUT CURRENT PATHOLOGICAL FRACTURE: ICD-10-CM

## 2022-06-06 DIAGNOSIS — K21.9 GASTROESOPHAGEAL REFLUX DISEASE WITHOUT ESOPHAGITIS: ICD-10-CM

## 2022-06-06 PROCEDURE — 258N000003 HC RX IP 258 OP 636: Performed by: INTERNAL MEDICINE

## 2022-06-06 PROCEDURE — 250N000011 HC RX IP 250 OP 636: Performed by: INTERNAL MEDICINE

## 2022-06-06 PROCEDURE — 96365 THER/PROPH/DIAG IV INF INIT: CPT

## 2022-06-06 RX ORDER — MEPERIDINE HYDROCHLORIDE 25 MG/ML
25 INJECTION INTRAMUSCULAR; INTRAVENOUS; SUBCUTANEOUS EVERY 30 MIN PRN
Status: CANCELLED | OUTPATIENT
Start: 2022-06-06

## 2022-06-06 RX ORDER — EPINEPHRINE 1 MG/ML
0.3 INJECTION, SOLUTION INTRAMUSCULAR; SUBCUTANEOUS EVERY 5 MIN PRN
Status: CANCELLED | OUTPATIENT
Start: 2022-06-06

## 2022-06-06 RX ORDER — ZOLEDRONIC ACID 5 MG/100ML
5 INJECTION, SOLUTION INTRAVENOUS ONCE
Status: CANCELLED
Start: 2022-06-06 | End: 2022-06-06

## 2022-06-06 RX ORDER — ALBUTEROL SULFATE 0.83 MG/ML
2.5 SOLUTION RESPIRATORY (INHALATION)
Status: CANCELLED | OUTPATIENT
Start: 2022-06-06

## 2022-06-06 RX ORDER — HEPARIN SODIUM (PORCINE) LOCK FLUSH IV SOLN 100 UNIT/ML 100 UNIT/ML
5 SOLUTION INTRAVENOUS
Status: CANCELLED | OUTPATIENT
Start: 2022-06-06

## 2022-06-06 RX ORDER — HEPARIN SODIUM,PORCINE 10 UNIT/ML
5 VIAL (ML) INTRAVENOUS
Status: CANCELLED | OUTPATIENT
Start: 2022-06-06

## 2022-06-06 RX ORDER — ZOLEDRONIC ACID 5 MG/100ML
5 INJECTION, SOLUTION INTRAVENOUS ONCE
Status: COMPLETED | OUTPATIENT
Start: 2022-06-06 | End: 2022-06-06

## 2022-06-06 RX ORDER — NALOXONE HYDROCHLORIDE 0.4 MG/ML
0.2 INJECTION, SOLUTION INTRAMUSCULAR; INTRAVENOUS; SUBCUTANEOUS
Status: CANCELLED | OUTPATIENT
Start: 2022-06-06

## 2022-06-06 RX ORDER — ALBUTEROL SULFATE 90 UG/1
1-2 AEROSOL, METERED RESPIRATORY (INHALATION)
Status: CANCELLED
Start: 2022-06-06

## 2022-06-06 RX ORDER — DIPHENHYDRAMINE HYDROCHLORIDE 50 MG/ML
50 INJECTION INTRAMUSCULAR; INTRAVENOUS
Status: CANCELLED
Start: 2022-06-06

## 2022-06-06 RX ORDER — METHYLPREDNISOLONE SODIUM SUCCINATE 125 MG/2ML
125 INJECTION, POWDER, LYOPHILIZED, FOR SOLUTION INTRAMUSCULAR; INTRAVENOUS
Status: CANCELLED
Start: 2022-06-06

## 2022-06-06 RX ADMIN — SODIUM CHLORIDE 250 ML: 9 INJECTION, SOLUTION INTRAVENOUS at 10:52

## 2022-06-06 RX ADMIN — ZOLEDRONIC ACID 5 MG: 0.05 INJECTION, SOLUTION INTRAVENOUS at 10:53

## 2022-06-06 ASSESSMENT — PAIN SCALES - GENERAL: PAINLEVEL: NO PAIN (0)

## 2022-06-06 NOTE — PROGRESS NOTES
Infusion Nursing Note:  Beena Diaz presents today for Reclast infusion.    Patient seen by provider today: No   present during visit today: Not Applicable.    Note: first time dose Reclast. Education completed.Handouts given. .      Intravenous Access:  Peripheral IV placed.    Treatment Conditions:  Lab Results   Component Value Date     10/14/2021    POTASSIUM 3.9 10/14/2021    CR 0.67 06/02/2022    KERLINE 9.3 06/02/2022    BILITOTAL 0.9 10/14/2021    ALBUMIN 3.6 06/02/2022    ALT 38 10/14/2021    AST 25 10/14/2021     Results reviewed, labs MET treatment parameters, ok to proceed with treatment.      Post Infusion Assessment:  Patient tolerated infusion without incident.  Site patent and intact, free from redness, edema or discomfort.  Access discontinued per protocol.       Discharge Plan:   Discharge instructions reviewed with: Patient.  Patient and/or family verbalized understanding of discharge instructions and all questions answered.  Copy of AVS reviewed with patient and/or family.  Patient will return -tbd- for next appointment.  Patient discharged in stable condition accompanied by: self.  Departure Mode: Ambulatory.      Cristy Smith, ESTEFANI Guerra RN

## 2022-08-23 ENCOUNTER — NURSE TRIAGE (OUTPATIENT)
Dept: NURSING | Facility: CLINIC | Age: 67
End: 2022-08-23

## 2022-08-23 ENCOUNTER — HOSPITAL ENCOUNTER (OUTPATIENT)
Dept: CT IMAGING | Facility: CLINIC | Age: 67
Discharge: HOME | End: 2022-08-23
Attending: INTERNAL MEDICINE | Admitting: INTERNAL MEDICINE
Payer: MEDICARE

## 2022-08-23 ENCOUNTER — OFFICE VISIT (OUTPATIENT)
Dept: INTERNAL MEDICINE | Facility: CLINIC | Age: 67
End: 2022-08-23
Payer: MEDICARE

## 2022-08-23 VITALS
DIASTOLIC BLOOD PRESSURE: 83 MMHG | TEMPERATURE: 98.2 F | OXYGEN SATURATION: 97 % | BODY MASS INDEX: 21.63 KG/M2 | SYSTOLIC BLOOD PRESSURE: 129 MMHG | HEART RATE: 59 BPM | WEIGHT: 126 LBS

## 2022-08-23 DIAGNOSIS — S09.90XA TRAUMATIC INJURY OF HEAD, INITIAL ENCOUNTER: ICD-10-CM

## 2022-08-23 DIAGNOSIS — G44.219 EPISODIC TENSION-TYPE HEADACHE, NOT INTRACTABLE: ICD-10-CM

## 2022-08-23 DIAGNOSIS — S09.90XA TRAUMATIC INJURY OF HEAD, INITIAL ENCOUNTER: Primary | ICD-10-CM

## 2022-08-23 PROCEDURE — 99213 OFFICE O/P EST LOW 20 MIN: CPT | Performed by: INTERNAL MEDICINE

## 2022-08-23 PROCEDURE — G1010 CDSM STANSON: HCPCS

## 2022-08-23 ASSESSMENT — ASTHMA QUESTIONNAIRES
QUESTION_3 LAST FOUR WEEKS HOW OFTEN DID YOUR ASTHMA SYMPTOMS (WHEEZING, COUGHING, SHORTNESS OF BREATH, CHEST TIGHTNESS OR PAIN) WAKE YOU UP AT NIGHT OR EARLIER THAN USUAL IN THE MORNING: NOT AT ALL
QUESTION_5 LAST FOUR WEEKS HOW WOULD YOU RATE YOUR ASTHMA CONTROL: COMPLETELY CONTROLLED
QUESTION_2 LAST FOUR WEEKS HOW OFTEN HAVE YOU HAD SHORTNESS OF BREATH: NOT AT ALL
ACT_TOTALSCORE: 25
ACT_TOTALSCORE: 25
QUESTION_4 LAST FOUR WEEKS HOW OFTEN HAVE YOU USED YOUR RESCUE INHALER OR NEBULIZER MEDICATION (SUCH AS ALBUTEROL): NOT AT ALL
QUESTION_1 LAST FOUR WEEKS HOW MUCH OF THE TIME DID YOUR ASTHMA KEEP YOU FROM GETTING AS MUCH DONE AT WORK, SCHOOL OR AT HOME: NONE OF THE TIME

## 2022-08-23 NOTE — TELEPHONE ENCOUNTER
S-(situation): head injury last week, has on and off headaches since    B-(background):   Pt accidentally hit her forehead last week, was trying to fill a garden cart when it tilted and handle hit her forehead.    Pt did say she has on and off tension headaches prior to the injury. She is also concerned as her  had a subdural hematoma due to a previous head injury, so she would like herself checked.    Pt also has anxiety and this makes her more worried.    A-(assessment):   On and off headaches. Occasionally takes Tylenol and uses warm packs for pain relief.  Rated as mild and tolerable headache  Mild nausea    Can still do a mile walk  No bumps on forehead  No vomiting        R-(recommendations): Be seen within 24 hours. Transferred to .  Pt scheduled today at [facility].    Gi Rinaldi RN/[place] Nurse Advisor            Reason for Disposition    After 3 days and headache persists    Additional Information    Negative: ACUTE NEUROLOGIC SYMPTOM and symptom present now    Negative: Knocked out (unconscious) > 1 minute    Negative: Seizure (convulsion) occurred  (Exception: Prior history of seizures and now alert and without Acute Neurologic Symptoms.)    Negative: Neck pain after dangerous injury (e.g., MVA, diving, trampoline, contact sports, fall > 10 feet or 3 meters)  (Exception: Neck pain began > 1 hour after injury.)    Negative: Major bleeding (actively dripping or spurting) that can't be stopped    Negative: Penetrating head injury (e.g., knife, gunshot wound, metal object)    Negative: Sounds like a life-threatening emergency to the triager    Negative: Diagnosed with a concussion within last 14 days    Negative: Can't remember what happened (amnesia)    Negative: Vomiting once or more    Negative: Watery or blood-tinged fluid dripping from the nose or ears    Negative: ACUTE NEUROLOGIC SYMPTOM and now fine    Negative: Knocked out (unconscious) < 1 minute and now fine    Negative: Severe  headache    Negative: Dangerous injury (e.g., MVA, diving, trampoline, contact sports, fall > 10 feet or 3 meters) or severe blow from hard object (e.g., golf club or baseball bat)    Negative: Large swelling or bruise (> 2 inches or 5 cm)    Negative: Skin is split open or gaping (length > 1/2 inch or 12 mm)    Negative: Bleeding won't stop after 10 minutes of direct pressure (using correct technique)    Negative: One or two 'black eyes' (bruising, purple color of eyelids), and onset within 24 hours of head injury    Negative: Taking Coumadin (warfarin) or other strong blood thinner, or known bleeding disorder (e.g., thrombocytopenia)    Negative: Age over 65 years with an area of head swelling or bruise    Negative: Sounds like a serious injury to the triager    Negative: Patient is confused or is an unreliable provider of information (e.g., dementia, severe intellectual disability, alcohol intoxication)    Negative: No prior tetanus shots (or is not fully vaccinated) and any wound (e.g., cut or scrape)    Negative: HIV positive or severe immunodeficiency (severely weak immune system) and DIRTY cut or scrape    Negative: Patient wants to be seen    Negative: Last tetanus shot > 5 years ago and DIRTY cut or scrape    Negative: Last tetanus shot > 10 years ago and CLEAN cut or scrape    Protocols used: HEAD INJURY-A-[place]

## 2022-08-23 NOTE — PROGRESS NOTES
ASSESSMENT/PLAN                                                      (S09.90XA) Traumatic injury of head, initial encounter  (primary encounter diagnosis)  (G44.219) Episodic tension-type headache, not intractable  Plan: CT head to r/o acute intracranial pathology; provided no acute findings, recommend NSAIDs as needed.    Nikki Villalobos MD   [address] [address]  T: [phone], F: [phone]    SUBJECTIVE                                                      Beena Diaz is a very pleasant 66 year old female who presents with headaches:    Patient suffered a head injury (the handle of a garden cart hit her in the right forehead) 8/14/2022. No LOC. Area was tender for several days. Developed headaches 8/19/2022. Feel like tension headaches. Minimal relief with Tylenol use.    No nausea or vomiting.  No double or blurry vision.  No focal numbness, tingling, or weakness.  No difficulty with speech.  No cognitive impairment noted by patient or family.     Not on any blood-thinning medications.     OBJECTIVE                                                      /83   Pulse 59   Temp 98.2  F (36.8  C) (Oral)   Wt 57.2 kg (126 lb)   SpO2 97%   BMI 21.63 kg/m    Constitutional: well-appearing  Head: normocephalic, atraumatic  Musculoskeletal: normal gait and station  Psych: normal judgment and insight; normal mood and affect; recent and remote memory intact    ---    (Note documentation was completed, in part, with [employer] voice-recognition software. Documentation was reviewed, but some grammatical, spelling, and word errors may remain.)

## 2022-08-29 NOTE — PROGRESS NOTES
[facility]    Outpatient Physical Therapy Discharge Note  Patient: Beena Diaz  : 1955    Patient was seen for 6 visits for neck pain/osteoporosis from 3/28/22 to 22 with no follow up appointments.      Plan:  Discharge from therapy.     Reason for Discharge: Patient has met all goals.  Patient has failed to schedule further appointments.    Discharge Plan: Patient to continue home program.     If patient would like to return to therapy, they will need a new PT order from referring provider.    Briana Ritter, PT   2022

## 2022-10-03 ENCOUNTER — HEALTH MAINTENANCE LETTER (OUTPATIENT)
Age: 67
End: 2022-10-03

## 2022-10-25 NOTE — PROGRESS NOTES
[facility] Dermatology Note  Encounter Date: Oct 27, 2022  Office Visit     Dermatology Problem List:  FBSE, 10/27/2022 unremarkable  ___________________________________________    Assessment & Plan:    # Benign lesions: Multiple benign nevi (<100), solar lentigos, seborrheic keratoses.  Explained to patient benign nature of lesion. No treatment is necessary at this time unless the lesion changes or becomes symptomatic.   - ABCDs of melanoma were discussed and self skin checks were advised.  - Sun precaution was advised including the use of sun screens of SPF 30 or higher, sun protective clothing, and avoidance of tanning beds.    Procedures Performed:   None.    Follow-up: 1-2 year(s) in-person, or earlier for new or changing lesions    Staff and Scribe:     Scribe Disclosure:  IRASHEED, am serving as a scribe to document services personally performed by Ines Liu PA-C based on data collection and the provider's statements to me.   Provider Disclosure:   The documentation recorded by the scribe accurately reflects the services I personally performed and the decisions made by me.    All risks, benefits and alternatives were discussed with patient.  Patient is in agreement and understands the assessment and plan.  All questions were answered.    Ines Liu PA-C, [facility]  [facility]: Phone: [phone], Fax: [phone]  [facility]: Phone: [phone],  Fax: [phone]  [facility]: Phone: [phone], Fax: [phone]  ____________________________________________    CC: Skin Check (A lot of moles)    HPI:  Ms. Beena Diaz is a(n) 66 year old female who presents today as a new patient for FBSE. Referred to dermatology by Dr. Boyer on 12/20/21 for the examination of numerous moles.    The patient reports a spot of concern on the right side of her face that she  would like examined. No personal history of skin cancer. Daughter noticed a darker spot on her back. No personal hx skin cancer.     Patient is otherwise feeling well, without additional skin concerns.    Labs Reviewed:  N/A    Physical Exam:  Vitals: There were no vitals taken for this visit.  SKIN: Total skin excluding the undergarment areas was performed. The exam included the head/face, neck, both arms, chest, back, abdomen, both legs, digits and/or nails.   - Cardoso Skin Type III, rarely burns  - Multiple regular brown pigmented macules and papules are identified on the trunk and extremities, <100 nevi.   - Scattered brown macules on sun exposed areas.  - There are waxy stuck on tan to brown papules on the trunk and extremities.   - No other lesions of concern on areas examined.     Medications:  Current Outpatient Medications   Medication     albuterol (PROAIR HFA/PROVENTIL HFA/VENTOLIN HFA) 108 (90 Base) MCG/ACT inhaler     calcium carbonate (TUMS) 500 MG chewable tablet     Calcium Citrate-Vitamin D (CALCIUM + D PO)     cholecalciferol 50 MCG (2000 UT) CAPS     citalopram (CELEXA) 10 MG tablet     fluticasone (FLOVENT HFA) 110 MCG/ACT inhaler     multivitamin  with lutein (OCUVITE WITH LUTEIN) CAPS per capsule     No current facility-administered medications for this visit.      Past Medical History:   Patient Active Problem List   Diagnosis     Anxiety     GERD (gastroesophageal reflux disease)     Exercise-induced asthma     Multiple gastric polyps     Elevated fasting glucose     Generalized anxiety disorder     Headache     Hx of adenomatous colonic polyps     Hx of colonoscopy     Irritable bowel syndrome, unspecified type     Post-menopausal     Prediabetes     Elevated blood pressure reading without diagnosis of hypertension     Hyperlipidemia, unspecified hyperlipidemia type     Gastroesophageal reflux disease without esophagitis     Age-related osteoporosis without current pathological fracture      Past Medical History:   Diagnosis Date     Uncomplicated asthma         CC Tammy Boyer MD  [address]  [place]  [place] [address] on close of this encounter.

## 2022-10-27 ENCOUNTER — OFFICE VISIT (OUTPATIENT)
Dept: FAMILY MEDICINE | Facility: CLINIC | Age: 67
End: 2022-10-27
Payer: MEDICARE

## 2022-10-27 DIAGNOSIS — D22.9 MULTIPLE BENIGN NEVI: ICD-10-CM

## 2022-10-27 DIAGNOSIS — L81.4 SOLAR LENTIGO: Primary | ICD-10-CM

## 2022-10-27 DIAGNOSIS — D22.9 NUMEROUS MOLES: ICD-10-CM

## 2022-10-27 DIAGNOSIS — L82.1 SEBORRHEIC KERATOSIS: ICD-10-CM

## 2022-10-27 PROCEDURE — 99203 OFFICE O/P NEW LOW 30 MIN: CPT | Performed by: PHYSICIAN ASSISTANT

## 2022-10-27 ASSESSMENT — PAIN SCALES - GENERAL: PAINLEVEL: NO PAIN (0)

## 2022-10-27 NOTE — PATIENT INSTRUCTIONS
Patient Education     Checking for Skin Cancer  You can find cancer early by checking your skin each month. There are 3 kinds of skin cancer. They are melanoma, basal cell carcinoma, and squamous cell carcinoma. Doing monthly skin checks is the best way to find new marks or skin changes. Follow the instructions below for checking your skin.   The ABCDEs of checking moles for melanoma   Check your moles or growths for signs of melanoma using ABCDE:   Asymmetry: the sides of the mole or growth don t match  Border: the edges are ragged, notched, or blurred  Color: the color within the mole or growth varies  Diameter: the mole or growth is larger than 6 mm (size of a pencil eraser)  Evolving: the size, shape, or color of the mole or growth is changing (evolving is not shown in the images below)    Checking for other types of skin cancer  Basal cell carcinoma or squamous cell carcinoma have symptoms such as:     A spot or mole that looks different from all other marks on your skin  Changes in how an area feels, such as itching, tenderness, or pain  Changes in the skin's surface, such as oozing, bleeding, or scaliness  A sore that does not heal  New swelling or redness beyond the border of a mole    Who s at risk?  Anyone can get skin cancer. But you are at greater risk if you have:   Fair skin, light-colored hair, or light-colored eyes  Many moles or abnormal moles on your skin  A history of sunburns from sunlight or tanning beds  A family history of skin cancer  A history of exposure to radiation or chemicals  A weakened immune system  If you have had skin cancer in the past, you are at risk for recurring skin cancer.   How to check your skin  Do your monthly skin checkups in front of a full-length mirror. Check all parts of your body, including your:   Head (ears, face, neck, and scalp)  Torso (front, back, and sides)  Arms (tops, undersides, upper, and lower armpits)  Hands (palms, backs, and fingers, including  under the nails)  Buttocks and genitals  Legs (front, back, and sides)  Feet (tops, soles, toes, including under the nails, and between toes)  If you have a lot of moles, take digital photos of them each month. Make sure to take photos both up close and from a distance. These can help you see if any moles change over time.   Most skin changes are not cancer. But if you see any changes in your skin, call your doctor right away. Only he or she can diagnose a problem. If you have skin cancer, seeing your doctor can be the first step toward getting the treatment that could save your life.   Infor last reviewed this educational content on 4/1/2019 2000-2020 [employer] [address]. [address] [place] [address], [place] [address]. All rights reserved. This information is not intended as a substitute for professional medical care. Always follow your healthcare professional's instructions.       When should I call my doctor?  If you are worsening or not improving, please, contact us or seek urgent care as noted below.     Who should I call with questions (adults)?  [facility] (adult and pediatric): [phone]  [facility] (adult): [phone]  For urgent needs outside of business hours call the [facility] at [phone] and ask for the dermatology resident on call to be paged  If this is a medical emergency and you are unable to reach an ER, Call [phone]    Who should I call with questions (pediatric)?  [facility]- Pediatric Dermatology  Dr. Marge Suero, Dr. Rachael Watson, Dr. Indigo Rao, EARL Aceves, Dr. Jennifer Botello, Dr. Glenna Garduno & Dr. Sachin Chapman  Non-urgent nurse triage line; [phone]- Yasemin and Constanza JARA Care Coordinatorthi Pettit (/Complex ) [phone]    If you need a prescription refill, please contact your pharmacy. Refills are approved or denied by our  Physicians during normal business hours, Monday through Fridays  Per office policy, refills will not be granted if you have not been seen within the past year (or sooner depending on your child's condition)    Scheduling Information:  Pediatric Appointment Scheduling and Call Center [phone]  Radiology Scheduling- [phone]  Sedation Unit Scheduling- [phone]  [place] [place] Scheduling- General [phone]; Pediatric Dermatology [phone]  Main  Services: [phone]  Kyrgyz: [phone]  Vietnamese: [phone]  Hmong/Prydeinig/Urdu: [phone]  Preadmission Nursing Department Fax Number: [phone] (Fax all pre-operative paperwork to this number)    For urgent matters arising during evenings, weekends, or holidays that cannot wait for normal business hours please call [phone] and ask for the dermatology resident on call to be paged.

## 2022-10-27 NOTE — LETTER
10/27/2022         RE: Beena Diaz  [address]  [place] [address]        Dear Colleague,    Thank you for referring your patient, Beena Diaz, to the [facility] [place]. Please see a copy of my visit note below.    [facility] Dermatology Note  Encounter Date: Oct 27, 2022  Office Visit     Dermatology Problem List:  FBSE, 10/27/2022 unremarkable  ___________________________________________    Assessment & Plan:    # Benign lesions: Multiple benign nevi (<100), solar lentigos, seborrheic keratoses.  Explained to patient benign nature of lesion. No treatment is necessary at this time unless the lesion changes or becomes symptomatic.   - ABCDs of melanoma were discussed and self skin checks were advised.  - Sun precaution was advised including the use of sun screens of SPF 30 or higher, sun protective clothing, and avoidance of tanning beds.    Procedures Performed:   None.    Follow-up: 1-2 year(s) in-person, or earlier for new or changing lesions    Staff and Scribe:     Scribe Disclosure:  I, RASHEED LOPEZ, am serving as a scribe to document services personally performed by Ines Liu PA-C based on data collection and the provider's statements to me.   Provider Disclosure:   The documentation recorded by the scribe accurately reflects the services I personally performed and the decisions made by me.    All risks, benefits and alternatives were discussed with patient.  Patient is in agreement and understands the assessment and plan.  All questions were answered.    Ines Liu PA-C, MPAS  [facility]: Phone: [phone], Fax: [phone]  [facility]: Phone: [phone],  Fax: [phone]  [facility]: Phone: [phone], Fax: [phone]  ____________________________________________    CC: Skin Check (A lot of moles)    HPI:  Ms. Beena ALFORD  Emily is a(n) 66 year old female who presents today as a new patient for FBSE. Referred to dermatology by Dr. Boyer on 12/20/21 for the examination of numerous moles.    The patient reports a spot of concern on the right side of her face that she would like examined. No personal history of skin cancer. Daughter noticed a darker spot on her back. No personal hx skin cancer.     Patient is otherwise feeling well, without additional skin concerns.    Labs Reviewed:  N/A    Physical Exam:  Vitals: There were no vitals taken for this visit.  SKIN: Total skin excluding the undergarment areas was performed. The exam included the head/face, neck, both arms, chest, back, abdomen, both legs, digits and/or nails.   - Cardoso Skin Type III, rarely burns  - Multiple regular brown pigmented macules and papules are identified on the trunk and extremities, <100 nevi.   - Scattered brown macules on sun exposed areas.  - There are waxy stuck on tan to brown papules on the trunk and extremities.   - No other lesions of concern on areas examined.     Medications:  Current Outpatient Medications   Medication     albuterol (PROAIR HFA/PROVENTIL HFA/VENTOLIN HFA) 108 (90 Base) MCG/ACT inhaler     calcium carbonate (TUMS) 500 MG chewable tablet     Calcium Citrate-Vitamin D (CALCIUM + D PO)     cholecalciferol 50 MCG (2000 UT) CAPS     citalopram (CELEXA) 10 MG tablet     fluticasone (FLOVENT HFA) 110 MCG/ACT inhaler     multivitamin  with lutein (OCUVITE WITH LUTEIN) CAPS per capsule     No current facility-administered medications for this visit.      Past Medical History:   Patient Active Problem List   Diagnosis     Anxiety     GERD (gastroesophageal reflux disease)     Exercise-induced asthma     Multiple gastric polyps     Elevated fasting glucose     Generalized anxiety disorder     Headache     Hx of adenomatous colonic polyps     Hx of colonoscopy     Irritable bowel syndrome, unspecified type     Post-menopausal      Prediabetes     Elevated blood pressure reading without diagnosis of hypertension     Hyperlipidemia, unspecified hyperlipidemia type     Gastroesophageal reflux disease without esophagitis     Age-related osteoporosis without current pathological fracture     Past Medical History:   Diagnosis Date     Uncomplicated asthma         CC Tammy Boyer MD  [address]  [place]  [place] [id number] on close of this encounter.      Again, thank you for allowing me to participate in the care of your patient.        Sincerely,        Ines Liu PA-C

## 2022-12-16 ENCOUNTER — OFFICE VISIT (OUTPATIENT)
Dept: FAMILY MEDICINE | Facility: CLINIC | Age: 67
End: 2022-12-16
Payer: MEDICARE

## 2022-12-16 VITALS
HEIGHT: 64 IN | OXYGEN SATURATION: 99 % | TEMPERATURE: 98.2 F | DIASTOLIC BLOOD PRESSURE: 86 MMHG | BODY MASS INDEX: 21.94 KG/M2 | RESPIRATION RATE: 16 BRPM | WEIGHT: 128.5 LBS | HEART RATE: 82 BPM | SYSTOLIC BLOOD PRESSURE: 129 MMHG

## 2022-12-16 DIAGNOSIS — Z79.899 MEDICATION MANAGEMENT: ICD-10-CM

## 2022-12-16 DIAGNOSIS — Z13.0 SCREENING FOR DEFICIENCY ANEMIA: ICD-10-CM

## 2022-12-16 DIAGNOSIS — Z13.29 SCREENING FOR THYROID DISORDER: ICD-10-CM

## 2022-12-16 DIAGNOSIS — M81.0 AGE-RELATED OSTEOPOROSIS WITHOUT CURRENT PATHOLOGICAL FRACTURE: ICD-10-CM

## 2022-12-16 DIAGNOSIS — F41.1 GENERALIZED ANXIETY DISORDER: ICD-10-CM

## 2022-12-16 DIAGNOSIS — Z13.220 SCREENING FOR LIPID DISORDERS: ICD-10-CM

## 2022-12-16 DIAGNOSIS — Z86.16 HISTORY OF COVID-19: ICD-10-CM

## 2022-12-16 DIAGNOSIS — K21.9 GASTROESOPHAGEAL REFLUX DISEASE WITHOUT ESOPHAGITIS: ICD-10-CM

## 2022-12-16 DIAGNOSIS — K31.7 MULTIPLE GASTRIC POLYPS: ICD-10-CM

## 2022-12-16 DIAGNOSIS — Z00.00 ENCOUNTER FOR MEDICARE ANNUAL WELLNESS EXAM: Primary | ICD-10-CM

## 2022-12-16 DIAGNOSIS — J45.20 MILD INTERMITTENT ASTHMA WITHOUT COMPLICATION: ICD-10-CM

## 2022-12-16 DIAGNOSIS — F40.243 FLYING PHOBIA: ICD-10-CM

## 2022-12-16 PROCEDURE — 99214 OFFICE O/P EST MOD 30 MIN: CPT | Mod: 25 | Performed by: INTERNAL MEDICINE

## 2022-12-16 PROCEDURE — G0439 PPPS, SUBSEQ VISIT: HCPCS | Performed by: INTERNAL MEDICINE

## 2022-12-16 RX ORDER — DIAZEPAM 2 MG
2 TABLET ORAL DAILY PRN
Qty: 10 TABLET | Refills: 1 | Status: SHIPPED | OUTPATIENT
Start: 2022-12-16

## 2022-12-16 RX ORDER — FLUTICASONE PROPIONATE 110 UG/1
1 AEROSOL, METERED RESPIRATORY (INHALATION) 2 TIMES DAILY
Qty: 12 G | Refills: 11 | Status: SHIPPED | OUTPATIENT
Start: 2022-12-16 | End: 2024-02-22

## 2022-12-16 RX ORDER — CITALOPRAM HYDROBROMIDE 10 MG/1
15 TABLET ORAL DAILY
Qty: 135 TABLET | Refills: 2 | Status: SHIPPED | OUTPATIENT
Start: 2022-12-16 | End: 2023-11-01

## 2022-12-16 RX ORDER — ALBUTEROL SULFATE 90 UG/1
2 AEROSOL, METERED RESPIRATORY (INHALATION) EVERY 4 HOURS PRN
Qty: 18 G | Refills: 3 | Status: SHIPPED | OUTPATIENT
Start: 2022-12-16

## 2022-12-16 ASSESSMENT — ENCOUNTER SYMPTOMS
NAUSEA: 0
PARESTHESIAS: 0
ABDOMINAL PAIN: 0
SHORTNESS OF BREATH: 0
CHILLS: 0
EYE PAIN: 0
ARTHRALGIAS: 0
WEAKNESS: 0
PALPITATIONS: 0
SORE THROAT: 0
BREAST MASS: 0
FEVER: 0
HEARTBURN: 0
HEMATOCHEZIA: 0
HEADACHES: 0
DIZZINESS: 0
JOINT SWELLING: 0
COUGH: 0
HEMATURIA: 0
CONSTIPATION: 0
DYSURIA: 0
NERVOUS/ANXIOUS: 1
FREQUENCY: 0
DIARRHEA: 0
MYALGIAS: 0

## 2022-12-16 ASSESSMENT — ACTIVITIES OF DAILY LIVING (ADL): CURRENT_FUNCTION: NO ASSISTANCE NEEDED

## 2022-12-16 ASSESSMENT — PAIN SCALES - GENERAL: PAINLEVEL: NO PAIN (0)

## 2022-12-16 NOTE — PROGRESS NOTES
"SUBJECTIVE:   Beena is a 67 year old who presents for Preventive Visit.  Patient has been advised of split billing requirements and indicates understanding: Yes  Are you in the first 12 months of your Medicare coverage?  No    Healthy Habits:     In general, how would you rate your overall health?  Good    Frequency of exercise:  4-5 days/week    Duration of exercise:  30-45 minutes    Do you usually eat at least 4 servings of fruit and vegetables a day, include whole grains    & fiber and avoid regularly eating high fat or \"junk\" foods?  Yes    Taking medications regularly:  Yes    Medication side effects:  None, No muscle aches and No significant flushing    Ability to successfully perform activities of daily living:  No assistance needed    Home Safety:  No safety concerns identified    Hearing Impairment:  Difficulty following a conversation in a noisy restaurant or crowded room    In the past 6 months, have you been bothered by leaking of urine?  No    In general, how would you rate your overall mental or emotional health?  Good      PHQ-2 Total Score: 0    Additional concerns today:  No      Have you ever done Advance Care Planning? (For example, a Health Directive, POLST, or a discussion with a medical provider or your loved ones about your wishes): Yes, patient states has an Advance Care Planning document and will bring a copy to the clinic.       Fall risk  Fallen 2 or more times in the past year?: No  Any fall with injury in the past year?: No    Cognitive Screening   1) Repeat 3 items (Leader, Season, Table)    2) Clock draw: NORMAL  3) 3 item recall: Recalls 3 objects  Results: 3 items recalled: COGNITIVE IMPAIRMENT LESS LIKELY    Mini-CogTM Copyright NANCY Weinstein. Licensed by the author for use in [facility]; reprinted with permission (karlo@.[email or web address]). All rights reserved.      Do you have sleep apnea, excessive snoring or daytime drowsiness?: no    Reviewed and updated as needed this visit by " clinical staff   Tobacco  Allergies  Meds  Problems             Reviewed and updated as needed this visit by Provider    Allergies  Meds  Problems            Social History     Tobacco Use     Smoking status: Never     Smokeless tobacco: Never   Substance Use Topics     Alcohol use: Not Currently     If you drink alcohol do you typically have >3 drinks per day or >7 drinks per week? No    Alcohol Use 12/16/2022   Prescreen: >3 drinks/day or >7 drinks/week? No   Prescreen: >3 drinks/day or >7 drinks/week? -               Current providers sharing in care for this patient include:   Patient Care Team:  Tammy Boyer MD as PCP - General (Internal Medicine)  Dereje Kahn MD as MD (Gastroenterology)  Tammy Boyer MD as Assigned PCP  Dereje Kahn MD as Assigned Gastroenterology Provider  Ines Liu PA-C as Physician Assistant (Dermatology)  Ines Liu PA-C as Assigned Surgical Provider    The following health maintenance items are reviewed in Epic and correct as of today:  Health Maintenance   Topic Date Due     ZOSTER IMMUNIZATION (2 of 2) 12/20/2022     ASTHMA CONTROL TEST  02/23/2023     MAMMO SCREENING  12/01/2023     MEDICARE ANNUAL WELLNESS VISIT  12/16/2023     ANNUAL REVIEW OF HM ORDERS  12/16/2023     ASTHMA ACTION PLAN  12/16/2023     FALL RISK ASSESSMENT  12/16/2023     DTAP/TDAP/TD IMMUNIZATION (2 - Td or Tdap) 01/01/2024     LIPID  11/11/2026     COLORECTAL CANCER SCREENING  07/17/2027     ADVANCE CARE PLANNING  12/16/2027     DEXA  12/01/2036     HEPATITIS C SCREENING  Completed     PHQ-2 (once per calendar year)  Completed     INFLUENZA VACCINE  Completed     Pneumococcal Vaccine: 65+ Years  Completed     COVID-19 Vaccine  Completed     IPV IMMUNIZATION  Aged Out     MENINGITIS IMMUNIZATION  Aged Out           Breast CA Risk Assessment (FHS-7) 11/1/2021 12/1/2021   Do you have a family history of breast, colon, or ovarian cancer? No / Unknown No / Unknown         Mammogram  "Screening: Recommended mammography every year with patient discussion and risk factor consideration  Pertinent mammograms are reviewed under the imaging tab.    Review of Systems  Constitutional, HEENT, cardiovascular, pulmonary, GI, , musculoskeletal, neuro, skin, endocrine and psych systems are negative, except as otherwise noted.    OBJECTIVE:   /86 (BP Location: Right arm, Patient Position: Sitting)   Pulse 82   Temp 98.2  F (36.8  C) (Oral)   Resp 16   Ht 1.626 m (5' 4.02\")   Wt 58.3 kg (128 lb 8 oz)   SpO2 99%   BMI 22.05 kg/m   Estimated body mass index is 22.05 kg/m  as calculated from the following:    Height as of this encounter: 1.626 m (5' 4.02\").    Weight as of this encounter: 58.3 kg (128 lb 8 oz).     Physical Exam  GENERAL APPEARANCE: healthy, alert and no distress  EYES: Eyes grossly normal to inspection, PERRL and conjunctivae and sclerae normal  HENT: ear canals and TM's normal, nose and mouth without ulcers or lesions, oropharynx clear and oral mucous membranes moist. Turbinate hypertrophy on the left side of nose.  NECK: no adenopathy, no asymmetry, masses, or scars and thyroid normal to palpation  RESP: lungs clear to auscultation - no rales, rhonchi or wheezes  BREAST: normal without masses, tenderness or nipple discharge and no palpable axillary masses or adenopathy  CV: regular rate and rhythm, normal S1 S2, no S3 or S4, no murmur, click or rub, no peripheral edema and peripheral pulses strong  ABDOMEN: soft, nontender, no hepatosplenomegaly, no masses and bowel sounds normal  MS: no musculoskeletal defects are noted and gait is age appropriate without ataxia  SKIN: no suspicious lesions or rashes. Multiple moles on the back.  NEURO: Normal strength and tone, sensory exam grossly normal, mentation intact and speech normal  PSYCH: mentation appears normal and affect normal/bright        ASSESSMENT / PLAN:     Beena was seen today for physical.    Diagnoses and all orders for " this visit:    Encounter for Medicare annual wellness exam  Preventive health counseling was also done.  Last mammogram was in December as 21st 2021  She is due for mammogram  Colonoscopy in July 2017    Gastroesophageal reflux disease without esophagitis  Currently her symptoms are manageable    Age-related osteoporosis without current pathological fracture  Comments:  on IV reclast    She asked about dexa scan, and I instructed her that she is going to get it at least after 2 infusions.    Generalized anxiety disorder   She feels fine and asks for reduction of her citalopram dosage.  -     citalopram (CELEXA) 10 MG tablet; Take 1.5 tablets (15 mg) by mouth daily TAKE 1 AND 1/2 TABLETS BY MOUTH EVERY DAY  If needed we can lower the dose to 10 mg    Flying phobia   She has Xanax as her rescue medication when she travels, but she wants to try Valium for it rather than Xanax. Hence, I prescribed Valium for her and instructed her not to drink alcohol with it.  Comments:  XANAX and lorazepam did not work wants to try valium  Orders:  -     diazepam (VALIUM) 2 MG tablet; Take 1 tablet (2 mg) by mouth daily as needed for anxiety (travel)  Educated her about Valium    Multiple gastric polyps seen on endoscopy   She is taking pepcid at night. Pathology report showed benign finding.    Mild intermittent asthma without complication  Comments:  uses flovent once a day and twice a day with flare up  if she does not use it daily symptoms come back  Orders:  -     fluticasone (FLOVENT HFA) 110 MCG/ACT inhaler; Inhale 1 puff into the lungs 2 times daily  -     albuterol (PROAIR HFA/PROVENTIL HFA/VENTOLIN HFA) 108 (90 Base) MCG/ACT inhaler; Inhale 2 puffs into the lungs every 4 hours as needed for shortness of breath, wheezing or cough    Screening for thyroid disorder  -     TSH with free T4 reflex; Future    Screening for lipid disorders  -     Lipid panel reflex to direct LDL Fasting; Future    Screening for deficiency anemia  -  "    CBC with platelets; Future    Medication management  -     Basic metabolic panel  (Ca, Cl, CO2, Creat, Gluc, K, Na, BUN); Future    History of COVID-19  Past history    Other orders  -     REVIEW OF HEALTH MAINTENANCE PROTOCOL ORDERS     She asked if she can do mammogram every 2 years, but I informed her that doing it yearly is preferred, and she accepted.   Her BP was 150/91 mmHg when the vitals were taken, but she measures her BP regularly, and it was \"119 over something\" this morning. When measured again in clinic, it was normal. Per her, it became high because of the visit as this happens every time she comes to the clinic.      Patient has been advised of split billing requirements and indicates understanding: Yes      COUNSELING:  Reviewed preventive health counseling, as reflected in patient instructions  Special attention given to:       Regular exercise       Healthy diet/nutrition       Fall risk prevention       Immunizations     She already received zoster vaccine and is going to get it again in January.       Alcohol Use        Osteoporosis prevention/bone health       Colon cancer screening        She reports that she has never smoked. She has never used smokeless tobacco.      Appropriate preventive services were discussed with this patient, including applicable screening as appropriate for cardiovascular disease, diabetes, osteopenia/osteoporosis, and glaucoma.  As appropriate for age/gender, discussed screening for colorectal cancer, prostate cancer, breast cancer, and cervical cancer. Checklist reviewing preventive services available has been given to the patient.    Reviewed patients plan of care and provided an AVS. The Basic Care Plan (routine screening as documented in Health Maintenance) for Beena meets the Care Plan requirement. This Care Plan has been established and reviewed with the Patient.      Tammy Boyer MD  [facility] " Risks:      This document serves as a record of the services and decisions personally performed and made by Dr. Boyer. It was created on his behalf by Kevin Magallon, a trained medical scribe. The creation of this document is based the provider's statements to the medical scribe.

## 2022-12-16 NOTE — PATIENT INSTRUCTIONS
Valium  can be habit-forming. It should be taken as prescribed. Do not mix it  with alcohol. Be careful with driving.Do not loose the  Prescription.  Do not overuse this medication. It is a controlled substance.  You are due for mammogram.  Please call the following number to make appointment :  [phone]  It is located in [address]    You will be due for second IV Reclast in 6/2023    Follow up in one year for physical   Seek sooner medical attention if there is any worsening of symptoms or problems.            Patient Education   Personalized Prevention Plan  You are due for the preventive services outlined below.  Your care team is available to assist you in scheduling these services.  If you have already completed any of these items, please share that information with your care team to update in your medical record.  Health Maintenance Due   Topic Date Due    ANNUAL REVIEW OF HM ORDERS  12/20/2022    Asthma Action Plan - yearly  12/20/2022    Zoster (Shingles) Vaccine (2 of 2) 12/20/2022       Signs of Hearing Loss      Hearing much better with one ear can be a sign of hearing loss.   Hearing loss is a problem shared by many people. In fact, it is one of the most common health problems, particularly as people age. Most people age 65 and older have some hearing loss. By age 80, almost everyone does. Hearing loss often occurs slowly over the years. So you may not realize your hearing has gotten worse.  Have your hearing checked  Call your healthcare provider if you:  Have to strain to hear normal conversation  Have to watch other people s faces very carefully to follow what they re saying  Need to ask people to repeat what they ve said  Often misunderstand what people are saying  Turn the volume of the television or radio up so high that others complain  Feel that people are mumbling when they re talking to you  Find that the effort to hear leaves you feeling tired and irritated  Notice, when using the phone,  that you hear better with one ear than the other  [employer] last reviewed this educational content on 1/1/2020 2000-2021 [employer]. All rights reserved. This information is not intended as a substitute for professional medical care. Always follow your healthcare professional's instructions.

## 2022-12-16 NOTE — PROGRESS NOTES
"Beena is a 67 year old who is being evaluated via a billable video visit.      How would you like to obtain your AVS? {AVS Preference:901596}  If the video visit is dropped, the invitation should be resent by: {video visit invitation (Optional) :037160}  Will anyone else be joining your video visit? {:403193}  {If patient encounters technical issues they should call [phone] :779818}        {PROVIDER CHARTING PREFERENCE:347661}    Subjective   Beena is a 67 year old{ACCOMPANIED BY STATEMENT (Optional):545612}, presenting for the following health issues:  No chief complaint on file.      Healthy Habits:     In general, how would you rate your overall health?  Good    Frequency of exercise:  4-5 days/week    Duration of exercise:  30-45 minutes    Do you usually eat at least 4 servings of fruit and vegetables a day, include whole grains    & fiber and avoid regularly eating high fat or \"junk\" foods?  Yes    Taking medications regularly:  Yes    Medication side effects:  None, No muscle aches and No significant flushing    Ability to successfully perform activities of daily living:  No assistance needed    Home Safety:  No safety concerns identified    Hearing Impairment:  Difficulty following a conversation in a noisy restaurant or crowded room    In the past 6 months, have you been bothered by leaking of urine?  No    In general, how would you rate your overall mental or emotional health?  Good      PHQ-2 Total Score: 0    Additional concerns today:  No       {SUPERLIST (Optional):369939}  {additonal problems for provider to add (Optional):008223}    Review of Systems   Constitutional: Negative for chills and fever.   HENT: Negative for congestion, ear pain, hearing loss and sore throat.    Eyes: Negative for pain and visual disturbance.   Respiratory: Negative for cough and shortness of breath.    Cardiovascular: Negative for chest pain, palpitations and peripheral edema.   Gastrointestinal: Negative for " "abdominal pain, constipation, diarrhea, heartburn, hematochezia and nausea.   Breasts:  Negative for tenderness, breast mass and discharge.   Genitourinary: Negative for dysuria, frequency, genital sores, hematuria, pelvic pain, urgency, vaginal bleeding and vaginal discharge.   Musculoskeletal: Negative for arthralgias, joint swelling and myalgias.   Skin: Negative for rash.   Neurological: Negative for dizziness, weakness, headaches and paresthesias.   Psychiatric/Behavioral: Negative for mood changes. The patient is nervous/anxious.       {ROS COMP (Optional):795261}      Objective           Vitals:  No vitals were obtained today due to virtual visit.    Physical Exam   {video visit exam brief selected:275473::\"GENERAL: Healthy, alert and no distress\",\"EYES: Eyes grossly normal to inspection.  No discharge or erythema, or obvious scleral/conjunctival abnormalities.\",\"RESP: No audible wheeze, cough, or visible cyanosis.  No visible retractions or increased work of breathing.  \",\"SKIN: Visible skin clear. No significant rash, abnormal pigmentation or lesions.\",\"NEURO: Cranial nerves grossly intact.  Mentation and speech appropriate for age.\",\"PSYCH: Mentation appears normal, affect normal/bright, judgement and insight intact, normal speech and appearance well-groomed.\"}    {Diagnostic Test Results (Optional):452413}    {AMBULATORY ATTESTATION (Optional):814132}        Video-Visit Details    Video Start Time: {video visit start/end time for provider to select:507440}    Type of service:  Video Visit    Video End Time:{video visit start/end time for provider to select:767649}    Originating Location (pt. Location): {video visit patient location:734515::\"Home\"}    {PROVIDER LOCATION On-site should be selected for visits conducted from your clinic location or adjoining [facility] hospital, academic office, or other nearby [facility] building. Off-site should be selected for all other provider locations, including " "home:815904}    Distant Location (provider location):  {virtual location provider:041738}    Platform used for Video Visit: {Virtual Visit Platforms:099284::\"[place]\"}    "

## 2022-12-29 ENCOUNTER — LAB (OUTPATIENT)
Dept: LAB | Facility: CLINIC | Age: 67
End: 2022-12-29
Payer: MEDICARE

## 2022-12-29 DIAGNOSIS — Z13.220 SCREENING FOR LIPID DISORDERS: ICD-10-CM

## 2022-12-29 DIAGNOSIS — Z13.0 SCREENING FOR DEFICIENCY ANEMIA: ICD-10-CM

## 2022-12-29 DIAGNOSIS — Z13.29 SCREENING FOR THYROID DISORDER: ICD-10-CM

## 2022-12-29 DIAGNOSIS — Z79.899 MEDICATION MANAGEMENT: ICD-10-CM

## 2022-12-29 LAB
ANION GAP SERPL CALCULATED.3IONS-SCNC: 11 MMOL/L (ref 7–15)
BUN SERPL-MCNC: 12.6 MG/DL (ref 8–23)
CALCIUM SERPL-MCNC: 9.5 MG/DL (ref 8.8–10.2)
CHLORIDE SERPL-SCNC: 101 MMOL/L (ref 98–107)
CHOLEST SERPL-MCNC: 262 MG/DL
CREAT SERPL-MCNC: 0.67 MG/DL (ref 0.51–0.95)
DEPRECATED HCO3 PLAS-SCNC: 26 MMOL/L (ref 22–29)
ERYTHROCYTE [DISTWIDTH] IN BLOOD BY AUTOMATED COUNT: 13.1 % (ref 10–15)
GFR SERPL CREATININE-BSD FRML MDRD: >90 ML/MIN/1.73M2
GLUCOSE SERPL-MCNC: 97 MG/DL (ref 70–99)
HCT VFR BLD AUTO: 45.4 % (ref 35–47)
HDLC SERPL-MCNC: 103 MG/DL
HGB BLD-MCNC: 14.3 G/DL (ref 11.7–15.7)
LDLC SERPL CALC-MCNC: 133 MG/DL
MCH RBC QN AUTO: 30.1 PG (ref 26.5–33)
MCHC RBC AUTO-ENTMCNC: 31.5 G/DL (ref 31.5–36.5)
MCV RBC AUTO: 96 FL (ref 78–100)
NONHDLC SERPL-MCNC: 159 MG/DL
PLATELET # BLD AUTO: 289 10E3/UL (ref 150–450)
POTASSIUM SERPL-SCNC: 4.2 MMOL/L (ref 3.4–5.3)
RBC # BLD AUTO: 4.75 10E6/UL (ref 3.8–5.2)
SODIUM SERPL-SCNC: 138 MMOL/L (ref 136–145)
T4 FREE SERPL-MCNC: 0.98 NG/DL (ref 0.9–1.7)
TRIGL SERPL-MCNC: 130 MG/DL
TSH SERPL DL<=0.005 MIU/L-ACNC: 7.27 UIU/ML (ref 0.3–4.2)
WBC # BLD AUTO: 7.8 10E3/UL (ref 4–11)

## 2022-12-29 PROCEDURE — 80061 LIPID PANEL: CPT

## 2022-12-29 PROCEDURE — 36415 COLL VENOUS BLD VENIPUNCTURE: CPT

## 2022-12-29 PROCEDURE — 80048 BASIC METABOLIC PNL TOTAL CA: CPT

## 2022-12-29 PROCEDURE — 85027 COMPLETE CBC AUTOMATED: CPT

## 2022-12-29 PROCEDURE — 84443 ASSAY THYROID STIM HORMONE: CPT

## 2022-12-29 PROCEDURE — 84439 ASSAY OF FREE THYROXINE: CPT

## 2022-12-29 NOTE — RESULT ENCOUNTER NOTE
Your labs show some need for improvement with your cholesterol. Adding healthy fats and avoided processed foods can be helpful   Your thyroid is elevated indicating hypothyroidism. Please schedule an appt with Dr Boyer to discuss this. We can either recheck it or start meds depending on how you are feeling. This can be a telephone visit if you prefer   The remainder of your labs look excellent   TAMMY Baez CNP covering for Dr Boyer

## 2022-12-30 ENCOUNTER — MYC MEDICAL ADVICE (OUTPATIENT)
Dept: FAMILY MEDICINE | Facility: CLINIC | Age: 67
End: 2022-12-30
Payer: MEDICARE

## 2022-12-30 DIAGNOSIS — E05.90 SUBCLINICAL HYPERTHYROIDISM: ICD-10-CM

## 2022-12-30 DIAGNOSIS — E03.9 HYPOTHYROIDISM, UNSPECIFIED TYPE: ICD-10-CM

## 2022-12-30 DIAGNOSIS — R79.89 ELEVATED TSH: Primary | ICD-10-CM

## 2022-12-30 NOTE — TELEPHONE ENCOUNTER
To PCP    Patient requesting TSH levels drawn per your suggestion.     Lab pended for your Review.    Kayli Choi RN on 12/30/2022 at 11:48 AM

## 2023-01-09 ENCOUNTER — HOSPITAL ENCOUNTER (OUTPATIENT)
Dept: MAMMOGRAPHY | Facility: CLINIC | Age: 68
Discharge: HOME | End: 2023-01-09
Attending: INTERNAL MEDICINE | Admitting: INTERNAL MEDICINE
Payer: MEDICARE

## 2023-01-09 DIAGNOSIS — Z12.31 VISIT FOR SCREENING MAMMOGRAM: ICD-10-CM

## 2023-01-09 PROCEDURE — 77067 SCR MAMMO BI INCL CAD: CPT

## 2023-01-24 ENCOUNTER — LAB (OUTPATIENT)
Dept: LAB | Facility: CLINIC | Age: 68
End: 2023-01-24
Payer: MEDICARE

## 2023-01-24 DIAGNOSIS — E03.9 HYPOTHYROIDISM, UNSPECIFIED TYPE: ICD-10-CM

## 2023-01-24 DIAGNOSIS — R79.89 ELEVATED TSH: ICD-10-CM

## 2023-01-24 LAB — TSH SERPL DL<=0.005 MIU/L-ACNC: 3.24 UIU/ML (ref 0.3–4.2)

## 2023-01-24 PROCEDURE — 36415 COLL VENOUS BLD VENIPUNCTURE: CPT

## 2023-01-24 PROCEDURE — 84443 ASSAY THYROID STIM HORMONE: CPT

## 2023-01-25 NOTE — RESULT ENCOUNTER NOTE
Araceli Hargrove    This is to inform you regarding your test result.    TSH which is thyroid hormone is normal.  Recheck in 6 months     Sincerely,      Dr.Nasima Merlin MD,FACP

## 2023-04-14 ENCOUNTER — LAB (OUTPATIENT)
Dept: LAB | Facility: CLINIC | Age: 68
End: 2023-04-14
Payer: MEDICARE

## 2023-04-14 DIAGNOSIS — E05.90 SUBCLINICAL HYPERTHYROIDISM: ICD-10-CM

## 2023-04-14 LAB
THYROPEROXIDASE AB SERPL-ACNC: <10 IU/ML
TSH SERPL DL<=0.005 MIU/L-ACNC: 2.39 UIU/ML (ref 0.3–4.2)

## 2023-04-14 PROCEDURE — 84443 ASSAY THYROID STIM HORMONE: CPT

## 2023-04-14 PROCEDURE — 86376 MICROSOMAL ANTIBODY EACH: CPT

## 2023-04-14 PROCEDURE — 36415 COLL VENOUS BLD VENIPUNCTURE: CPT

## 2023-04-14 NOTE — RESULT ENCOUNTER NOTE
Dear Beena,     I am covering for Dr. Boyer today:    Here are your recent thyroid-stimulating hormone and thyroid peroxidase antibody results which are normal.    Please let us know if you have any questions or concerns.    Regards,  Jenn Muir PA-C

## 2023-05-23 ENCOUNTER — TELEPHONE (OUTPATIENT)
Dept: FAMILY MEDICINE | Facility: CLINIC | Age: 68
End: 2023-05-23
Payer: MEDICARE

## 2023-05-23 NOTE — TELEPHONE ENCOUNTER
Beena MARTINEZ Cs Triage Im (supporting Tammy Boyer MD) 11 hours ago (8:10 PM)     [facility] Dr Boyer   You asked me to remind you that I need an order for my next Reclast infusion in June.  Thank you    Pt sent [employer] message regarding Reclast   Last Reclast 6/6/22   Pended new plan under admit/therapy plan (unable to pend plan through [employer] messages)   Sussy MONET, Triage RN  [facility]

## 2023-05-25 NOTE — TELEPHONE ENCOUNTER
Huddled with ramona Edwards  with [facility] who states patient's orders are approved and she will reach out to patient to schedule.    Also notified patient via [employer] message.    Signing encounter.    Mateus Hawk RN  [facility]

## 2023-06-15 ENCOUNTER — LAB (OUTPATIENT)
Dept: INFUSION THERAPY | Facility: CLINIC | Age: 68
End: 2023-06-15
Attending: INTERNAL MEDICINE
Payer: MEDICARE

## 2023-06-15 DIAGNOSIS — K21.9 GASTROESOPHAGEAL REFLUX DISEASE WITHOUT ESOPHAGITIS: Primary | ICD-10-CM

## 2023-06-15 DIAGNOSIS — M81.0 AGE-RELATED OSTEOPOROSIS WITHOUT CURRENT PATHOLOGICAL FRACTURE: ICD-10-CM

## 2023-06-15 DIAGNOSIS — K31.7 MULTIPLE GASTRIC POLYPS: ICD-10-CM

## 2023-06-15 LAB
ALBUMIN SERPL BCG-MCNC: 4 G/DL (ref 3.5–5.2)
CALCIUM SERPL-MCNC: 9.7 MG/DL (ref 8.8–10.2)
CREAT SERPL-MCNC: 0.63 MG/DL (ref 0.51–0.95)
GFR SERPL CREATININE-BSD FRML MDRD: >90 ML/MIN/1.73M2

## 2023-06-15 PROCEDURE — 82565 ASSAY OF CREATININE: CPT | Performed by: INTERNAL MEDICINE

## 2023-06-15 PROCEDURE — 82040 ASSAY OF SERUM ALBUMIN: CPT | Performed by: INTERNAL MEDICINE

## 2023-06-15 PROCEDURE — 36415 COLL VENOUS BLD VENIPUNCTURE: CPT | Performed by: INTERNAL MEDICINE

## 2023-06-15 PROCEDURE — 82310 ASSAY OF CALCIUM: CPT | Performed by: INTERNAL MEDICINE

## 2023-06-15 RX ORDER — ZOLEDRONIC ACID 5 MG/100ML
5 INJECTION, SOLUTION INTRAVENOUS ONCE
Status: CANCELLED
Start: 2023-06-15

## 2023-06-15 RX ORDER — EPINEPHRINE 1 MG/ML
0.3 INJECTION, SOLUTION INTRAMUSCULAR; SUBCUTANEOUS EVERY 5 MIN PRN
Status: CANCELLED | OUTPATIENT
Start: 2023-06-15

## 2023-06-15 RX ORDER — ALBUTEROL SULFATE 0.83 MG/ML
2.5 SOLUTION RESPIRATORY (INHALATION)
Status: CANCELLED | OUTPATIENT
Start: 2023-06-15

## 2023-06-15 RX ORDER — METHYLPREDNISOLONE SODIUM SUCCINATE 125 MG/2ML
125 INJECTION, POWDER, LYOPHILIZED, FOR SOLUTION INTRAMUSCULAR; INTRAVENOUS
Status: CANCELLED
Start: 2023-06-15

## 2023-06-15 RX ORDER — ACETAMINOPHEN 325 MG/1
325 TABLET ORAL ONCE
Status: CANCELLED | OUTPATIENT
Start: 2023-06-15

## 2023-06-15 RX ORDER — ALBUTEROL SULFATE 90 UG/1
1-2 AEROSOL, METERED RESPIRATORY (INHALATION)
Status: CANCELLED
Start: 2023-06-15

## 2023-06-15 RX ORDER — DIPHENHYDRAMINE HYDROCHLORIDE 50 MG/ML
50 INJECTION INTRAMUSCULAR; INTRAVENOUS
Status: CANCELLED
Start: 2023-06-15

## 2023-06-15 RX ORDER — MEPERIDINE HYDROCHLORIDE 25 MG/ML
25 INJECTION INTRAMUSCULAR; INTRAVENOUS; SUBCUTANEOUS EVERY 30 MIN PRN
Status: CANCELLED | OUTPATIENT
Start: 2023-06-15

## 2023-06-15 RX ORDER — HEPARIN SODIUM,PORCINE 10 UNIT/ML
5 VIAL (ML) INTRAVENOUS
Status: CANCELLED | OUTPATIENT
Start: 2023-06-15

## 2023-06-15 RX ORDER — HEPARIN SODIUM (PORCINE) LOCK FLUSH IV SOLN 100 UNIT/ML 100 UNIT/ML
5 SOLUTION INTRAVENOUS
Status: CANCELLED | OUTPATIENT
Start: 2023-06-15

## 2023-06-15 NOTE — PROGRESS NOTES
Medical Assistant Note:  Beena Diaz presents today for Lab draw.    Patient seen by provider today: No.   present during visit today: Not Applicable.    Concerns: No Concerns.    Procedure:  Lab draw site: RAC, Needle type: Butterfly, Gauge: 23.    Post Assessment:  Labs drawn without difficulty: Yes.    Discharge Plan:  Departure Mode: Ambulatory.    Face to Face Time: 4 min.    Shari Schoenberger, CMA

## 2023-06-16 NOTE — RESULT ENCOUNTER NOTE
Araceli Hargrove    This is to inform you regarding your test result.    Creatinine which is kidney function is fine  Calcium level is fine   Albumin level is normal    Sincerely,      Dr.Nasima Merlin MD,FACP

## 2023-06-19 ENCOUNTER — INFUSION THERAPY VISIT (OUTPATIENT)
Dept: INFUSION THERAPY | Facility: CLINIC | Age: 68
End: 2023-06-19
Attending: INTERNAL MEDICINE
Payer: MEDICARE

## 2023-06-19 VITALS
SYSTOLIC BLOOD PRESSURE: 135 MMHG | BODY MASS INDEX: 22.09 KG/M2 | TEMPERATURE: 97.9 F | DIASTOLIC BLOOD PRESSURE: 83 MMHG | HEART RATE: 86 BPM | HEIGHT: 64 IN | WEIGHT: 129.4 LBS | OXYGEN SATURATION: 98 % | RESPIRATION RATE: 16 BRPM

## 2023-06-19 DIAGNOSIS — K21.9 GASTROESOPHAGEAL REFLUX DISEASE WITHOUT ESOPHAGITIS: ICD-10-CM

## 2023-06-19 DIAGNOSIS — K31.7 MULTIPLE GASTRIC POLYPS: Primary | ICD-10-CM

## 2023-06-19 DIAGNOSIS — M81.0 AGE-RELATED OSTEOPOROSIS WITHOUT CURRENT PATHOLOGICAL FRACTURE: ICD-10-CM

## 2023-06-19 PROCEDURE — 250N000013 HC RX MED GY IP 250 OP 250 PS 637: Performed by: INTERNAL MEDICINE

## 2023-06-19 PROCEDURE — 258N000003 HC RX IP 258 OP 636: Performed by: INTERNAL MEDICINE

## 2023-06-19 PROCEDURE — 96365 THER/PROPH/DIAG IV INF INIT: CPT

## 2023-06-19 PROCEDURE — 250N000011 HC RX IP 250 OP 636: Performed by: INTERNAL MEDICINE

## 2023-06-19 RX ORDER — HEPARIN SODIUM (PORCINE) LOCK FLUSH IV SOLN 100 UNIT/ML 100 UNIT/ML
5 SOLUTION INTRAVENOUS
Status: DISCONTINUED | OUTPATIENT
Start: 2023-06-19 | End: 2023-06-19 | Stop reason: HOSPADM

## 2023-06-19 RX ORDER — DIPHENHYDRAMINE HYDROCHLORIDE 50 MG/ML
50 INJECTION INTRAMUSCULAR; INTRAVENOUS
Status: CANCELLED
Start: 2023-06-19

## 2023-06-19 RX ORDER — METHYLPREDNISOLONE SODIUM SUCCINATE 125 MG/2ML
125 INJECTION, POWDER, LYOPHILIZED, FOR SOLUTION INTRAMUSCULAR; INTRAVENOUS
Status: CANCELLED
Start: 2023-06-19

## 2023-06-19 RX ORDER — HEPARIN SODIUM,PORCINE 10 UNIT/ML
5 VIAL (ML) INTRAVENOUS
Status: CANCELLED | OUTPATIENT
Start: 2023-06-19

## 2023-06-19 RX ORDER — HEPARIN SODIUM (PORCINE) LOCK FLUSH IV SOLN 100 UNIT/ML 100 UNIT/ML
5 SOLUTION INTRAVENOUS
Status: CANCELLED | OUTPATIENT
Start: 2023-06-19

## 2023-06-19 RX ORDER — ALBUTEROL SULFATE 0.83 MG/ML
2.5 SOLUTION RESPIRATORY (INHALATION)
Status: CANCELLED | OUTPATIENT
Start: 2023-06-19

## 2023-06-19 RX ORDER — ALBUTEROL SULFATE 90 UG/1
1-2 AEROSOL, METERED RESPIRATORY (INHALATION)
Status: CANCELLED
Start: 2023-06-19

## 2023-06-19 RX ORDER — MEPERIDINE HYDROCHLORIDE 25 MG/ML
25 INJECTION INTRAMUSCULAR; INTRAVENOUS; SUBCUTANEOUS EVERY 30 MIN PRN
Status: CANCELLED | OUTPATIENT
Start: 2023-06-19

## 2023-06-19 RX ORDER — ZOLEDRONIC ACID 5 MG/100ML
5 INJECTION, SOLUTION INTRAVENOUS ONCE
Status: CANCELLED
Start: 2023-06-19

## 2023-06-19 RX ORDER — EPINEPHRINE 1 MG/ML
0.3 INJECTION, SOLUTION INTRAMUSCULAR; SUBCUTANEOUS EVERY 5 MIN PRN
Status: CANCELLED | OUTPATIENT
Start: 2023-06-19

## 2023-06-19 RX ORDER — ZOLEDRONIC ACID 5 MG/100ML
5 INJECTION, SOLUTION INTRAVENOUS ONCE
Status: COMPLETED | OUTPATIENT
Start: 2023-06-19 | End: 2023-06-19

## 2023-06-19 RX ORDER — ACETAMINOPHEN 325 MG/1
325 TABLET ORAL ONCE
Status: CANCELLED | OUTPATIENT
Start: 2023-06-19

## 2023-06-19 RX ORDER — ACETAMINOPHEN 325 MG/1
325 TABLET ORAL ONCE
Status: COMPLETED | OUTPATIENT
Start: 2023-06-19 | End: 2023-06-19

## 2023-06-19 RX ADMIN — ACETAMINOPHEN 325 MG: 325 TABLET, FILM COATED ORAL at 13:01

## 2023-06-19 RX ADMIN — ZOLEDRONIC ACID 5 MG: 0.05 INJECTION, SOLUTION INTRAVENOUS at 13:01

## 2023-06-19 RX ADMIN — SODIUM CHLORIDE 250 ML: 9 INJECTION, SOLUTION INTRAVENOUS at 13:07

## 2023-06-19 NOTE — PROGRESS NOTES
Infusion Nursing Note:  Beena Diaz presents today for Reclast.    Patient seen by provider today: No   present during visit today: Not Applicable.    Note: Pt confirms she is taking her Ca+ Vit D as prescribed.       Intravenous Access:  Peripheral IV placed.    Treatment Conditions:  Lab Results   Component Value Date     12/29/2022    POTASSIUM 4.2 12/29/2022    CR 0.63 06/15/2023    KERLINE 9.7 06/15/2023    BILITOTAL 0.9 10/14/2021    ALBUMIN 4.0 06/15/2023    ALT 38 10/14/2021    AST 25 10/14/2021     Results reviewed, labs MET treatment parameters, ok to proceed with treatment.      Post Infusion Assessment:  Patient tolerated infusion without incident.  Blood return noted pre and post infusion.  Site patent and intact, free from redness, edema or discomfort.  No evidence of extravasations.  Access discontinued per protocol.       Discharge Plan:   Patient declined prescription refills.  Discharge instructions reviewed with: Patient.  Patient and/or family verbalized understanding of discharge instructions and all questions answered.  AVS to patient via [employer].  Patient will return for next appointment as advised by her provider.   Patient discharged in stable condition accompanied by: self.      Zamzam Scherer, RN

## 2023-11-01 ENCOUNTER — MYC REFILL (OUTPATIENT)
Dept: FAMILY MEDICINE | Facility: CLINIC | Age: 68
End: 2023-11-01
Payer: MEDICARE

## 2023-11-01 DIAGNOSIS — F41.1 GENERALIZED ANXIETY DISORDER: ICD-10-CM

## 2023-11-01 RX ORDER — CITALOPRAM HYDROBROMIDE 10 MG/1
15 TABLET ORAL DAILY
Qty: 135 TABLET | Refills: 0 | Status: SHIPPED | OUTPATIENT
Start: 2023-11-01 | End: 2023-12-11

## 2023-12-11 ENCOUNTER — VIRTUAL VISIT (OUTPATIENT)
Dept: FAMILY MEDICINE | Facility: CLINIC | Age: 68
End: 2023-12-11
Payer: MEDICARE

## 2023-12-11 DIAGNOSIS — E78.5 HYPERLIPIDEMIA, UNSPECIFIED HYPERLIPIDEMIA TYPE: ICD-10-CM

## 2023-12-11 DIAGNOSIS — F41.1 GENERALIZED ANXIETY DISORDER: Primary | ICD-10-CM

## 2023-12-11 DIAGNOSIS — Z13.0 SCREENING FOR DEFICIENCY ANEMIA: ICD-10-CM

## 2023-12-11 DIAGNOSIS — M81.0 AGE-RELATED OSTEOPOROSIS WITHOUT CURRENT PATHOLOGICAL FRACTURE: ICD-10-CM

## 2023-12-11 DIAGNOSIS — E03.9 HYPOTHYROIDISM, UNSPECIFIED TYPE: ICD-10-CM

## 2023-12-11 DIAGNOSIS — Z79.899 MEDICATION MANAGEMENT: ICD-10-CM

## 2023-12-11 PROCEDURE — 99214 OFFICE O/P EST MOD 30 MIN: CPT | Mod: VID | Performed by: INTERNAL MEDICINE

## 2023-12-11 RX ORDER — CITALOPRAM HYDROBROMIDE 20 MG/1
20 TABLET ORAL DAILY
Qty: 90 TABLET | Refills: 3 | Status: SHIPPED | OUTPATIENT
Start: 2023-12-11 | End: 2024-06-13

## 2023-12-11 ASSESSMENT — ENCOUNTER SYMPTOMS
FEVER: 0
CHILLS: 0
MYALGIAS: 0
HEADACHES: 0
SHORTNESS OF BREATH: 0
ABDOMINAL PAIN: 0
SORE THROAT: 0
ARTHRALGIAS: 0
NAUSEA: 0
HEMATOCHEZIA: 0
FREQUENCY: 0
BREAST MASS: 0
EYE PAIN: 0
PALPITATIONS: 0
DIZZINESS: 0
PARESTHESIAS: 0
CONSTIPATION: 0
COUGH: 0
DYSURIA: 0
NERVOUS/ANXIOUS: 0
JOINT SWELLING: 0
HEARTBURN: 0
WEAKNESS: 0
HEMATURIA: 0
DIARRHEA: 0

## 2023-12-11 ASSESSMENT — ACTIVITIES OF DAILY LIVING (ADL): CURRENT_FUNCTION: NO ASSISTANCE NEEDED

## 2023-12-11 ASSESSMENT — ASTHMA QUESTIONNAIRES: ACT_TOTALSCORE: 24

## 2023-12-11 NOTE — PATIENT INSTRUCTIONS
You will be due for mammogram and bone density after 1/9/24  Please call the following number to make appointment :  [phone]  It is located in [address]    Follow up in 6 months.  Seek sooner medical attention if there is any worsening of symptoms or problems.

## 2023-12-11 NOTE — PROGRESS NOTES
"SUBJECTIVE:   Beena is a 67 year old, presenting for the following:  No chief complaint on file.    {(!) Visit Details have not yet been documented.  Please enter Visit Details and then use this list to pull in documentation. (Optional):919526}    Are you in the first 12 months of your Medicare coverage?  { :625841}    Healthy Habits:     In general, how would you rate your overall health?  Good    Frequency of exercise:  4-5 days/week    Duration of exercise:  45-60 minutes    Do you usually eat at least 4 servings of fruit and vegetables a day, include whole grains    & fiber and avoid regularly eating high fat or \"junk\" foods?  Yes    Taking medications regularly:  Yes    Medication side effects:  None, No muscle aches and No significant flushing    Ability to successfully perform activities of daily living:  No assistance needed    Home Safety:  No safety concerns identified    Hearing Impairment:  No hearing concerns    In the past 6 months, have you been bothered by leaking of urine?  No    In general, how would you rate your overall mental or emotional health?  Good    Additional concerns today:  No          Have you ever done Advance Care Planning? (For example, a Health Directive, POLST, or a discussion with a medical provider or your loved ones about your wishes): { :787121}    {Hearing Test Done (Optional):548517}   Fall risk  { :206083}  {If any of the above assessments are answered yes, consider ordering appropriate referrals (Optional):939147}  Cognitive Screening { :172885}    {Do you have sleep apnea, excessive snoring or daytime drowsiness? (Optional):708865}    Reviewed and updated as needed this visit by clinical staff                  Reviewed and updated as needed this visit by Provider                 Social History     Tobacco Use    Smoking status: Never    Smokeless tobacco: Never   Substance Use Topics    Alcohol use: Not Currently     {Rooming staff  Click this link to complete the " Prescreen if response below is not for today's visit  Alcohol Use Prescreen >3 drinks/day or > 7 drinks/week.  If the prescreen question answer is YES, complete the full AUDIT  :532823}        12/8/2023    10:32 AM   Alcohol Use   Prescreen: >3 drinks/day or >7 drinks/week? No   {add AUDIT responses (Optional) (A score of 7 for adult men is an indication of hazardous drinking; a score of 8 or more is an indication of an alcohol use disorder.  A score of 7 or more for adult women is an indication of hazardous drinking or an alchohol use disorder):733297}  Do you have a current opioid prescription? { :158350}  Do you use any other controlled substances or medications that are not prescribed by a provider? {Substance Use :650957}  {Provider  If there are gaps in the social history shown above, please follow the link and refresh the note Link to Social and Substance History :245745}    {Outside tests to abstract? (Optional):236109}    {additional problems to add (Optional):673889}    Current providers sharing in care for this patient include: {Rooming staff:  Please update Care Team from storyboard, refresh this note and then delete this statement}  Patient Care Team:  Tammy Boyer MD as PCP - General (Internal Medicine)  Dereje Kahn MD as MD (Gastroenterology)  Tammy Boyer MD as Assigned PCP  Ines Liu PA-C as Physician Assistant (Dermatology)  Ines Liu PA-C as Assigned Surgical Provider    The following health maintenance items are reviewed in Epic and correct as of today:  Health Maintenance   Topic Date Due    RSV VACCINE (Pregnancy & 60+) (1 - 1-dose 60+ series) Never done    PHQ-2 (once per calendar year)  01/01/2023    MEDICARE ANNUAL WELLNESS VISIT  12/16/2023    ANNUAL REVIEW OF HM ORDERS  12/16/2023    ASTHMA ACTION PLAN  12/16/2023    DTAP/TDAP/TD IMMUNIZATION (2 - Td or Tdap) 01/01/2024    ASTHMA CONTROL TEST  06/11/2024    FALL RISK ASSESSMENT  12/11/2024    MAMMO SCREENING   01/09/2025    COLORECTAL CANCER SCREENING  07/17/2027    ADVANCE CARE PLANNING  12/16/2027    LIPID  12/29/2027    DEXA  12/01/2036    HEPATITIS C SCREENING  Completed    INFLUENZA VACCINE  Completed    Pneumococcal Vaccine: 65+ Years  Completed    ZOSTER IMMUNIZATION  Completed    COVID-19 Vaccine  Completed    IPV IMMUNIZATION  Aged Out    HPV IMMUNIZATION  Aged Out    MENINGITIS IMMUNIZATION  Aged Out    RSV MONOCLONAL ANTIBODY  Aged Out     {Chronicprobdata (optional):515192}  {Decision Support (Optional):209578}        11/1/2021     4:23 PM 12/1/2021     9:04 AM   Breast CA Risk Assessment (FHS-7)   Do you have a family history of breast, colon, or ovarian cancer? No / Unknown No / Unknown       {If any of the questions to the BCRA (FHS-7) are answered yes, consider ordering referral for genetic counseling (Optional) :173825}  {AMB Mammogram Decision Support (Optional) :333300}  Pertinent mammograms are reviewed under the imaging tab.    Review of Systems   Constitutional:  Negative for chills and fever.   HENT:  Negative for congestion, ear pain, hearing loss and sore throat.    Eyes:  Negative for pain and visual disturbance.   Respiratory:  Negative for cough and shortness of breath.    Cardiovascular:  Negative for chest pain, palpitations and peripheral edema.   Gastrointestinal:  Negative for abdominal pain, constipation, diarrhea, heartburn, hematochezia and nausea.   Breasts:  Negative for tenderness, breast mass and discharge.   Genitourinary:  Negative for dysuria, frequency, genital sores, hematuria, pelvic pain, urgency, vaginal bleeding and vaginal discharge.   Musculoskeletal:  Negative for arthralgias, joint swelling and myalgias.   Skin:  Negative for rash.   Neurological:  Negative for dizziness, weakness, headaches and paresthesias.   Psychiatric/Behavioral:  Negative for mood changes. The patient is not nervous/anxious.      {ROS COMP (Optional):388332}    OBJECTIVE:   There were no vitals  "taken for this visit. Estimated body mass index is 22.2 kg/m  as calculated from the following:    Height as of 23: 1.626 m (5' 4.02\").    Weight as of 23: 58.7 kg (129 lb 6.4 oz).  Physical Exam  {Exam (Optional) :997085}    {Diagnostic Test Results (Optional):256874}    ASSESSMENT / PLAN:   {Diag Picklist:837771}    {Patient advised of split billing (Optional):233284}      COUNSELING:  {Medicare Counselin}        She reports that she has never smoked. She has never used smokeless tobacco.      Appropriate preventive services were discussed with this patient, including applicable screening as appropriate for fall prevention, nutrition, physical activity, Tobacco-use cessation, weight loss and cognition.  Checklist reviewing preventive services available has been given to the patient.    Reviewed patients plan of care and provided an AVS. The {CarePlan:480707} for Beena meets the Care Plan requirement. This Care Plan has been established and reviewed with the {PATIENT, FAMILY MEMBER, CAREGIVER:501487}.    {Counseling Resources  [place] Preventive Services Task Force  Cholesterol Screening  Health diet/nutrition  Pooled Cohorts Equation Calculator  USDA's MyPlate  ASA Prophylaxis  Lung CA Screening  Osteoporosis prevention/bone health :210100}  {Breast Cancer Risk Calculator  BRCA-Related Cancer Risk Assessment FHS-7 Tool :216167}    Tammy Boyer MD  [facility]    Identified Health Risks:  {Medicare required documentation of substance and opioid use disorders screening :863640}  "

## 2023-12-11 NOTE — PROGRESS NOTES
Beena is a 67 year old who is being evaluated via a billable video visit.      How would you like to obtain your AVS? MyChart  If the video visit is dropped, the invitation should be resent by: Text to cell phone: [phone]  Will anyone else be joining your video visit? No          Beena was seen today for osteoporosis.    Diagnoses and all orders for this visit:    Generalized anxiety disorder  -     citalopram (CELEXA) 20 MG tablet; Take 1 tablet (20 mg) by mouth daily For mood(will increase from 15 mg to 20 mg on patient's request)  Patient reports struggling with some anxiety. I increased her celexa dose to 20 mg daily  She has 10 mg pills she can take to to finish current supply and the new pill will be 20 mg 1 tablet a day which was explained to her very clearly    Age-related osteoporosis without current pathological fracture  -     DX Hip/Pelvis/Spine; Future  Last DEXA was 12/2021, she is due again 12/2023, patient will call to schedule. Patient reports doing yoga.  She is on IV Reclast    Hypothyroidism, unspecified type  -     TSH with free T4 reflex; Future  Low-cholesterol low-fat diet    Hyperlipidemia, unspecified hyperlipidemia type  -     Lipid panel reflex to direct LDL Non-fasting; Future  I ordered labs for cholesterol, patient will call to schedule.    Medication management  -     Comprehensive metabolic panel; Future    Screening for deficiency anemia  -     CBC with platelets; Future    Other orders  -     REVIEW OF HEALTH MAINTENANCE PROTOCOL ORDERS    Other  Patient uses inhaler as needed, usually only 1-2x weekly.  Last endoscopy in 2022 show polyps.  Last mammogram was 01/09/2023, next one due 01/09/2024, patient will call to schedule.  I recommend following up in person in 6 months.  Patient was counseled on diet and exercise.    Preventive health counseling was also done.      Subjective   Beena is a 67 year old, presenting for the following health issues:  Osteoporosis      HPI    Beena is a 67 year old, presenting for the following health issues:  Osteoporosis      Review of Systems   Constitutional, HEENT, cardiovascular, pulmonary, GI, , musculoskeletal, neuro, skin, endocrine and psych systems are negative, except as otherwise noted.      Objective    Vitals - Patient Reported  Pain Score: No Pain (0)      Vitals:  No vitals were obtained today due to virtual visit.    Physical Exam   GENERAL: Healthy, alert and no distress  EYES: Eyes grossly normal to inspection.  No discharge or erythema, or obvious scleral/conjunctival abnormalities.  RESP: No audible wheeze, cough, or visible cyanosis.  No visible retractions or increased work of breathing.    SKIN: Visible skin clear. No significant rash, abnormal pigmentation or lesions.  NEURO: Cranial nerves grossly intact.  Mentation and speech appropriate for age.  PSYCH: Mentation appears normal, affect normal/bright, judgement and insight intact, normal speech and appearance well-groomed.    This document serves as a record of the services and decisions personally performed and made by Dr. Boyer. It was created on her behalf by Deloris Bridges, a trained medical scribe. The creation of this document is based the provider's statements to the medical scribe.         Video-Visit Details    Type of service:  Video Visit   Video Start Time: 1:42 PM  Video End Time:1:56 PM    Originating Location (pt. Location): Home    Distant Location (provider location):  On-site  Platform used for Video Visit: Frequent Browser

## 2023-12-12 ENCOUNTER — PATIENT OUTREACH (OUTPATIENT)
Dept: CARE COORDINATION | Facility: CLINIC | Age: 68
End: 2023-12-12
Payer: MEDICARE

## 2024-01-04 ENCOUNTER — PATIENT OUTREACH (OUTPATIENT)
Dept: GASTROENTEROLOGY | Facility: CLINIC | Age: 69
End: 2024-01-04
Payer: MEDICARE

## 2024-01-16 ENCOUNTER — HOSPITAL ENCOUNTER (OUTPATIENT)
Dept: BONE DENSITY | Facility: CLINIC | Age: 69
Discharge: HOME | End: 2024-01-16
Attending: INTERNAL MEDICINE
Payer: MEDICARE

## 2024-01-16 ENCOUNTER — HOSPITAL ENCOUNTER (OUTPATIENT)
Dept: MAMMOGRAPHY | Facility: CLINIC | Age: 69
Discharge: HOME | End: 2024-01-16
Attending: INTERNAL MEDICINE
Payer: MEDICARE

## 2024-01-16 DIAGNOSIS — Z12.31 ENCOUNTER FOR SCREENING MAMMOGRAM FOR BREAST CANCER: ICD-10-CM

## 2024-01-16 DIAGNOSIS — M81.0 AGE-RELATED OSTEOPOROSIS WITHOUT CURRENT PATHOLOGICAL FRACTURE: ICD-10-CM

## 2024-01-16 PROCEDURE — 77080 DXA BONE DENSITY AXIAL: CPT

## 2024-01-16 PROCEDURE — 77063 BREAST TOMOSYNTHESIS BI: CPT

## 2024-01-18 NOTE — RESULT ENCOUNTER NOTE
Araceli Hargrove    This is to inform you regarding your test result.    Bone density shows osteopenia   According to bone density   INTERVAL CHANGE  -There has been a 9.1% increase in lumbar spine BMD.  -There has been a 5.4% increase in bilateral hip BMD.          Sincerely,      Dr.Nasima Merlin MD,FACP

## 2024-02-19 ENCOUNTER — MYC REFILL (OUTPATIENT)
Dept: FAMILY MEDICINE | Facility: CLINIC | Age: 69
End: 2024-02-19
Payer: MEDICARE

## 2024-02-19 DIAGNOSIS — J45.20 MILD INTERMITTENT ASTHMA WITHOUT COMPLICATION: ICD-10-CM

## 2024-02-19 RX ORDER — FLUTICASONE PROPIONATE 110 UG/1
1 AEROSOL, METERED RESPIRATORY (INHALATION) 2 TIMES DAILY
Qty: 12 G | Refills: 11 | Status: CANCELLED | OUTPATIENT
Start: 2024-02-19

## 2024-02-21 ENCOUNTER — MYC MEDICAL ADVICE (OUTPATIENT)
Dept: FAMILY MEDICINE | Facility: CLINIC | Age: 69
End: 2024-02-21
Payer: MEDICARE

## 2024-02-21 DIAGNOSIS — J45.20 MILD INTERMITTENT ASTHMA WITHOUT COMPLICATION: Primary | ICD-10-CM

## 2024-02-22 NOTE — TELEPHONE ENCOUNTER
Per patient Arnuity Ellipta would be covered   So sent the script of that   Thank you    Dr.Nasima Merlin MD

## 2024-02-23 RX ORDER — FLUTICASONE PROPIONATE 110 UG/1
1 AEROSOL, METERED RESPIRATORY (INHALATION) 2 TIMES DAILY
Qty: 36 G | Refills: 1 | Status: SHIPPED | OUTPATIENT
Start: 2024-02-23

## 2024-02-23 NOTE — TELEPHONE ENCOUNTER
See MyChart from patient needing provider review.  Please write back or advise if clinic follow up needed.     [facility] RN Triage Team

## 2024-03-09 ENCOUNTER — HEALTH MAINTENANCE LETTER (OUTPATIENT)
Age: 69
End: 2024-03-09

## 2024-05-30 ENCOUNTER — LAB (OUTPATIENT)
Dept: LAB | Facility: CLINIC | Age: 69
End: 2024-05-30
Payer: MEDICARE

## 2024-05-30 DIAGNOSIS — E03.9 HYPOTHYROIDISM, UNSPECIFIED TYPE: ICD-10-CM

## 2024-05-30 DIAGNOSIS — Z13.0 SCREENING FOR DEFICIENCY ANEMIA: ICD-10-CM

## 2024-05-30 DIAGNOSIS — E78.5 HYPERLIPIDEMIA, UNSPECIFIED HYPERLIPIDEMIA TYPE: ICD-10-CM

## 2024-05-30 DIAGNOSIS — Z79.899 MEDICATION MANAGEMENT: ICD-10-CM

## 2024-05-30 LAB
ALBUMIN SERPL BCG-MCNC: 4.4 G/DL (ref 3.5–5.2)
ALP SERPL-CCNC: 60 U/L (ref 40–150)
ALT SERPL W P-5'-P-CCNC: 18 U/L (ref 0–50)
ANION GAP SERPL CALCULATED.3IONS-SCNC: 10 MMOL/L (ref 7–15)
AST SERPL W P-5'-P-CCNC: 20 U/L (ref 0–45)
BILIRUB SERPL-MCNC: 0.9 MG/DL
BUN SERPL-MCNC: 11.4 MG/DL (ref 8–23)
CALCIUM SERPL-MCNC: 9.6 MG/DL (ref 8.8–10.2)
CHLORIDE SERPL-SCNC: 102 MMOL/L (ref 98–107)
CHOLEST SERPL-MCNC: 214 MG/DL
CREAT SERPL-MCNC: 0.69 MG/DL (ref 0.51–0.95)
DEPRECATED HCO3 PLAS-SCNC: 26 MMOL/L (ref 22–29)
EGFRCR SERPLBLD CKD-EPI 2021: >90 ML/MIN/1.73M2
ERYTHROCYTE [DISTWIDTH] IN BLOOD BY AUTOMATED COUNT: 12.6 % (ref 10–15)
FASTING STATUS PATIENT QL REPORTED: YES
FASTING STATUS PATIENT QL REPORTED: YES
GLUCOSE SERPL-MCNC: 94 MG/DL (ref 70–99)
HCT VFR BLD AUTO: 45.3 % (ref 35–47)
HDLC SERPL-MCNC: 101 MG/DL
HGB BLD-MCNC: 14.4 G/DL (ref 11.7–15.7)
LDLC SERPL CALC-MCNC: 98 MG/DL
MCH RBC QN AUTO: 30.4 PG (ref 26.5–33)
MCHC RBC AUTO-ENTMCNC: 31.8 G/DL (ref 31.5–36.5)
MCV RBC AUTO: 96 FL (ref 78–100)
NONHDLC SERPL-MCNC: 113 MG/DL
PLATELET # BLD AUTO: 295 10E3/UL (ref 150–450)
POTASSIUM SERPL-SCNC: 4.1 MMOL/L (ref 3.4–5.3)
PROT SERPL-MCNC: 7.2 G/DL (ref 6.4–8.3)
RBC # BLD AUTO: 4.73 10E6/UL (ref 3.8–5.2)
SODIUM SERPL-SCNC: 138 MMOL/L (ref 135–145)
T4 FREE SERPL-MCNC: 1.12 NG/DL (ref 0.9–1.7)
TRIGL SERPL-MCNC: 76 MG/DL
TSH SERPL DL<=0.005 MIU/L-ACNC: 4.72 UIU/ML (ref 0.3–4.2)
WBC # BLD AUTO: 6.5 10E3/UL (ref 4–11)

## 2024-05-30 PROCEDURE — 36415 COLL VENOUS BLD VENIPUNCTURE: CPT

## 2024-05-30 PROCEDURE — 80061 LIPID PANEL: CPT

## 2024-05-30 PROCEDURE — 80053 COMPREHEN METABOLIC PANEL: CPT

## 2024-05-30 PROCEDURE — 84443 ASSAY THYROID STIM HORMONE: CPT

## 2024-05-30 PROCEDURE — 85027 COMPLETE CBC AUTOMATED: CPT

## 2024-05-30 PROCEDURE — 84439 ASSAY OF FREE THYROXINE: CPT

## 2024-05-31 DIAGNOSIS — E05.90 SUBCLINICAL HYPERTHYROIDISM: ICD-10-CM

## 2024-05-31 DIAGNOSIS — R79.89 ELEVATED TSH: Primary | ICD-10-CM

## 2024-05-31 NOTE — RESULT ENCOUNTER NOTE
Araceli Hargrove    This is to inform you regarding your test result.    CBC result which includes white count Hemoglobin and  Platelet Counts is normal.   Other test results are pending.          Sincerely,      Dr.Nasima Merlin MD,FACP        
Araceli Hargrove    This is to inform you regarding your test result.    Your total cholesterol is elevated.  HDL which is called good cholesterol is normal.  Your LDL which is called bad cholesterol is normal  TSH which is thyroid hormone is elevated but free T 4 level is normal  You do not need medication at this point.  But we need to check your level again in 4 months  Make lab appointment   The testing of your kidney function, liver function and electrolytes was satisfactory   CBC result which includes white count Hemoglobin and  Platelet Counts is normal.       Sincerely,      Dr.Nasima Merlin MD,FACP        
yes

## 2024-06-08 SDOH — HEALTH STABILITY: PHYSICAL HEALTH: ON AVERAGE, HOW MANY MINUTES DO YOU ENGAGE IN EXERCISE AT THIS LEVEL?: 60 MIN

## 2024-06-08 SDOH — HEALTH STABILITY: PHYSICAL HEALTH: ON AVERAGE, HOW MANY DAYS PER WEEK DO YOU ENGAGE IN MODERATE TO STRENUOUS EXERCISE (LIKE A BRISK WALK)?: 4 DAYS

## 2024-06-08 ASSESSMENT — ASTHMA QUESTIONNAIRES
QUESTION_1 LAST FOUR WEEKS HOW MUCH OF THE TIME DID YOUR ASTHMA KEEP YOU FROM GETTING AS MUCH DONE AT WORK, SCHOOL OR AT HOME: NONE OF THE TIME
QUESTION_4 LAST FOUR WEEKS HOW OFTEN HAVE YOU USED YOUR RESCUE INHALER OR NEBULIZER MEDICATION (SUCH AS ALBUTEROL): ONCE A WEEK OR LESS
QUESTION_3 LAST FOUR WEEKS HOW OFTEN DID YOUR ASTHMA SYMPTOMS (WHEEZING, COUGHING, SHORTNESS OF BREATH, CHEST TIGHTNESS OR PAIN) WAKE YOU UP AT NIGHT OR EARLIER THAN USUAL IN THE MORNING: NOT AT ALL
ACT_TOTALSCORE: 24
ACT_TOTALSCORE: 24
QUESTION_2 LAST FOUR WEEKS HOW OFTEN HAVE YOU HAD SHORTNESS OF BREATH: NOT AT ALL
QUESTION_5 LAST FOUR WEEKS HOW WOULD YOU RATE YOUR ASTHMA CONTROL: COMPLETELY CONTROLLED

## 2024-06-08 ASSESSMENT — SOCIAL DETERMINANTS OF HEALTH (SDOH): HOW OFTEN DO YOU GET TOGETHER WITH FRIENDS OR RELATIVES?: MORE THAN THREE TIMES A WEEK

## 2024-06-10 ENCOUNTER — OFFICE VISIT (OUTPATIENT)
Dept: FAMILY MEDICINE | Facility: CLINIC | Age: 69
End: 2024-06-10
Payer: MEDICARE

## 2024-06-10 DIAGNOSIS — L81.4 SOLAR LENTIGO: ICD-10-CM

## 2024-06-10 DIAGNOSIS — D18.01 CHERRY ANGIOMA: ICD-10-CM

## 2024-06-10 DIAGNOSIS — L72.0 EIC (EPIDERMAL INCLUSION CYST): ICD-10-CM

## 2024-06-10 DIAGNOSIS — D22.9 MULTIPLE BENIGN NEVI: Primary | ICD-10-CM

## 2024-06-10 DIAGNOSIS — L82.1 SEBORRHEIC KERATOSIS: ICD-10-CM

## 2024-06-10 PROCEDURE — 99213 OFFICE O/P EST LOW 20 MIN: CPT | Performed by: PHYSICIAN ASSISTANT

## 2024-06-10 NOTE — PROGRESS NOTES
[facility] Dermatology Note  Encounter Date: Prateek 10, 2024  Office Visit      Dermatology Problem List:  FBSE: 6/10/24    Cyst, R axilla.   ____________________________________________    Assessment & Plan:    # Cyst, R axilla  - Discussed the natural history and benign nature of this lesion. Reassurance provided that no additional treatment is necessary.      # Benign findings: multiple benign nevi, lentigines, cherry angiomas, SKs  - edu on benign etiology  - Signs and Symptoms of non-melanoma skin cancer and ABCDEs of melanoma reviewed with patient. Patient encouraged to perform monthly self skin exams and educated on how to perform them. UV precautions reviewed with patient. Patient was asked about new or changing moles/lesions on body.   - Sunscreen: Apply 20 minutes prior to going outdoors and reapply every two hours, when wet or sweating. We recommend using an SPF 30 or higher, and to use one that is water resistant.     - RTC for changes    Procedures Performed:   None     Follow-up: 1 year(s) in-person, or earlier for new or changing lesions    Staff and scribe:    Scribe Disclosure:   I, YASHIRA AGUIAR, am serving as a scribe; to document services personally performed by Ines Liu PA-C -based on data collection and the provider's statements to me.     Provider Disclosure:  I agree with above History, Review of Systems, Physical exam and Plan.  I have reviewed the content of the documentation and have edited it as needed. I have personally performed the services documented here and the documentation accurately represents those services and the decisions I have made.      Electronically signed by:    All risks, benefits and alternatives were discussed with patient.  Patient is in agreement and understands the assessment and plan.  All questions were answered.    Ines Liu PA-C, MPAS  [facility]: Phone:  [phone], Fax: [phone]  [facility]: Phone: [phone],  Fax: [phone]  [facility]: Phone: [phone], Fax: [phone]  ____________________________________________    CC: Skin Check (FBSC)      Reviewed patients past medical history and pertinent chart review prior to patient's visit today.     HPI:  Ms. Beena Diaz is a 68 year old female who presents today as a return patient for FBSC. Last FBSC 10/27/22 was unremarkable.     Today patient did not report any spots of concern. She does note a new spot on her R underarm.     Patient is otherwise feeling well, without additional concerns.    Labs:  N/A    Physical Exam:  Vitals: There were no vitals taken for this visit.  SKIN: Total skin excluding the undergarment areas was performed. The exam included the head/face, neck, both arms, chest, back, abdomen, both legs, digits and/or nails.    - - Cardoso's skin type II, has <100 nevi  - There are dome shaped bright red papules on the trunk.   - Multiple regular brown pigmented macules and papules are identified on the trunk and extremities.   - Scattered brown macules on sun exposed areas.  - There are waxy stuck on tan to brown papules on the trunk.    - Pea size cystic punctum lesion on her R axilla. .   - No other lesions of concern on areas examined.     Medications:  Current Outpatient Medications   Medication Sig Dispense Refill    albuterol (PROAIR HFA/PROVENTIL HFA/VENTOLIN HFA) 108 (90 Base) MCG/ACT inhaler Inhale 2 puffs into the lungs every 4 hours as needed for shortness of breath, wheezing or cough 18 g 3    calcium carbonate (TUMS) 500 MG chewable tablet Take 1 chew tab by mouth 2 times daily as needed for heartburn      Calcium Citrate-Vitamin D (CALCIUM + D PO) 750 mg Lithothermion      cholecalciferol 50 MCG (2000 UT) CAPS       diazepam (VALIUM) 2 MG tablet Take 1 tablet (2 mg) by mouth daily as needed for anxiety (travel) 10 tablet 1     fluticasone (FLOVENT HFA) 110 MCG/ACT inhaler Inhale 1 puff into the lungs 2 times daily 36 g 1    Multiple Minerals-Vitamins (CALCIUM-MAGNESIUM-ZINC-D3 PO) Take by mouth daily      multivitamin  with lutein (OCUVITE WITH LUTEIN) CAPS per capsule Take 1 capsule by mouth daily      citalopram (CELEXA) 20 MG tablet Take 1 tablet (20 mg) by mouth daily For mood 90 tablet 3    fluticasone (ARNUITY ELLIPTA) 100 MCG/ACT inhaler Inhale 1 puff into the lungs daily 30 each 3     No current facility-administered medications for this visit.      Past Medical/Surgical History:   Patient Active Problem List   Diagnosis    Anxiety    GERD (gastroesophageal reflux disease)    Exercise-induced asthma    Multiple gastric polyps    Elevated fasting glucose    Generalized anxiety disorder    Headache    Hx of adenomatous colonic polyps    Hx of colonoscopy    Irritable bowel syndrome, unspecified type    Post-menopausal    Prediabetes    Elevated blood pressure reading without diagnosis of hypertension    Hyperlipidemia, unspecified hyperlipidemia type    Gastroesophageal reflux disease without esophagitis    Age-related osteoporosis without current pathological fracture    History of COVID-19    Mild intermittent asthma without complication    Flying phobia     Past Medical History:   Diagnosis Date    Uncomplicated asthma

## 2024-06-10 NOTE — LETTER
6/10/2024      Beena Diaz  [address] [place] [facility] [address]      Dear Colleague,    Thank you for referring your patient, Beena Diaz, to the [facility]. Please see a copy of my visit note below.    [facility] Dermatology Note  Encounter Date: Prateek 10, 2024  Office Visit      Dermatology Problem List:  FBSE: 6/10/24    Cyst, R axilla.   ____________________________________________    Assessment & Plan:    # Cyst, R axilla  - Discussed the natural history and benign nature of this lesion. Reassurance provided that no additional treatment is necessary.      # Benign findings: multiple benign nevi, lentigines, cherry angiomas, SKs  - edu on benign etiology  - Signs and Symptoms of non-melanoma skin cancer and ABCDEs of melanoma reviewed with patient. Patient encouraged to perform monthly self skin exams and educated on how to perform them. UV precautions reviewed with patient. Patient was asked about new or changing moles/lesions on body.   - Sunscreen: Apply 20 minutes prior to going outdoors and reapply every two hours, when wet or sweating. We recommend using an SPF 30 or higher, and to use one that is water resistant.     - RTC for changes    Procedures Performed:   None     Follow-up: 1 year(s) in-person, or earlier for new or changing lesions    Staff and scribe:    Scribe Disclosure:   I, YASHIRA AGUIAR, am serving as a scribe; to document services personally performed by Ines Liu PA-C -based on data collection and the provider's statements to me.     Provider Disclosure:  I agree with above History, Review of Systems, Physical exam and Plan.  I have reviewed the content of the documentation and have edited it as needed. I have personally performed the services documented here and the documentation accurately represents those services and the decisions I have made.      Electronically signed by:    All risks, benefits and alternatives were  discussed with patient.  Patient is in agreement and understands the assessment and plan.  All questions were answered.    Ines Liu PA-C, MPAS  [facility]: Phone: [phone], Fax: [phone]  [facility]: Phone: [phone],  Fax: [phone]  [facility]: Phone: [phone], Fax: [phone]  ____________________________________________    CC: Skin Check (FBSC)      Reviewed patients past medical history and pertinent chart review prior to patient's visit today.     HPI:  Ms. Beena Diaz is a 68 year old female who presents today as a return patient for FBSC. Last FBSC 10/27/22 was unremarkable.     Today patient did not report any spots of concern. She does note a new spot on her R underarm.     Patient is otherwise feeling well, without additional concerns.    Labs:  N/A    Physical Exam:  Vitals: There were no vitals taken for this visit.  SKIN: Total skin excluding the undergarment areas was performed. The exam included the head/face, neck, both arms, chest, back, abdomen, both legs, digits and/or nails.    - - Cardoso's skin type II, has <100 nevi  - There are dome shaped bright red papules on the trunk.   - Multiple regular brown pigmented macules and papules are identified on the trunk and extremities.   - Scattered brown macules on sun exposed areas.  - There are waxy stuck on tan to brown papules on the trunk.    - Pea size cystic punctum lesion on her R axilla. .   - No other lesions of concern on areas examined.     Medications:  Current Outpatient Medications   Medication Sig Dispense Refill     albuterol (PROAIR HFA/PROVENTIL HFA/VENTOLIN HFA) 108 (90 Base) MCG/ACT inhaler Inhale 2 puffs into the lungs every 4 hours as needed for shortness of breath, wheezing or cough 18 g 3     calcium carbonate (TUMS) 500 MG chewable tablet Take 1 chew tab by mouth 2 times daily as needed  for heartburn       Calcium Citrate-Vitamin D (CALCIUM + D PO) 750 mg Lithothermion       cholecalciferol 50 MCG (2000 UT) CAPS        diazepam (VALIUM) 2 MG tablet Take 1 tablet (2 mg) by mouth daily as needed for anxiety (travel) 10 tablet 1     fluticasone (FLOVENT HFA) 110 MCG/ACT inhaler Inhale 1 puff into the lungs 2 times daily 36 g 1     Multiple Minerals-Vitamins (CALCIUM-MAGNESIUM-ZINC-D3 PO) Take by mouth daily       multivitamin  with lutein (OCUVITE WITH LUTEIN) CAPS per capsule Take 1 capsule by mouth daily       citalopram (CELEXA) 20 MG tablet Take 1 tablet (20 mg) by mouth daily For mood 90 tablet 3     fluticasone (ARNUITY ELLIPTA) 100 MCG/ACT inhaler Inhale 1 puff into the lungs daily 30 each 3     No current facility-administered medications for this visit.      Past Medical/Surgical History:   Patient Active Problem List   Diagnosis     Anxiety     GERD (gastroesophageal reflux disease)     Exercise-induced asthma     Multiple gastric polyps     Elevated fasting glucose     Generalized anxiety disorder     Headache     Hx of adenomatous colonic polyps     Hx of colonoscopy     Irritable bowel syndrome, unspecified type     Post-menopausal     Prediabetes     Elevated blood pressure reading without diagnosis of hypertension     Hyperlipidemia, unspecified hyperlipidemia type     Gastroesophageal reflux disease without esophagitis     Age-related osteoporosis without current pathological fracture     History of COVID-19     Mild intermittent asthma without complication     Flying phobia     Past Medical History:   Diagnosis Date     Uncomplicated asthma                         Again, thank you for allowing me to participate in the care of your patient.        Sincerely,        Ines Liu PA-C

## 2024-06-10 NOTE — PATIENT INSTRUCTIONS
Patient Education       Proper skin care from [facility]:    -Eliminate harsh soaps as they strip the natural oils from the skin, often resulting in dry itchy skin ( i.e. Dial, Zest, Amharic Spring)  -Use mild soaps such as Cetaphil or Dove Sensitive Skin in the shower. You do not need to use soap on arms, legs, and trunk every time you shower unless visibly soiled.   -Avoid hot or cold showers.  -After showering, lightly dry off and apply moisturizing within 2-3 minutes. This will help trap moisture in the skin.   -Aggressive use of a moisturizer at least 1-2 times a day to the entire body (including -Vanicream, Cetaphil, Aquaphor or Cerave) and moisturize hands after every washing.  -We recommend using moisturizers that come in a tub that needs to be scooped out, not a pump. This has more of an oil base. It will hold moisture in your skin much better than a water base moisturizer. The above recommended are non-pore clogging.      Wear a sunscreen with at least SPF 30 on your face, ears, neck and V of the chest daily. Wear sunscreen on other areas of the body if those areas are exposed to the sun throughout the day. Sunscreens can contain physical and/or chemical blockers. Physical blockers are less likely to clog pores, these include zinc oxide and titanium dioxide. Reapply every two hour and after swimming.     Sunscreen examples: https://www.ewg.org/sunscreen/    UV radiation  UVA radiation remains constant throughout the day and throughout the year. It is a longer wavelength than UVB and therefore penetrates deeper into the skin leading to immediate and delayed tanning, photoaging, and skin cancer. 70-80% of UVA and UVB radiation occurs between the hours of 10am-2pm.  UVB radiation  UVB radiation causes the most harmful effects and is more significant during the summer months. However, snow and ice can reflect UVB radiation leading to skin damage during the winter months as well. UVB radiation is  responsible for tanning, burning, inflammation, delayed erythema (pinkness), pigmentation (brown spots), and skin cancer.     I recommend self monthly full body exams and yearly full body exams with a dermatology provider. If you develop a new or changing lesion please follow up for examination. Most skin cancers are pink and scaly or pink and pearly. However, we do see blue/brown/black skin cancers.  Consider the ABCDEs of melanoma when giving yourself your monthly full body exam ( don't forget the groin, buttocks, feet, toes, etc). A-asymmetry, B-borders, C-color, D-diameter, E-elevation or evolving. If you see any of these changes please follow up in clinic. If you cannot see your back I recommend purchasing a hand held mirror to use with a larger wall mirror.       Checking for Skin Cancer  You can find cancer early by checking your skin each month. There are 3 kinds of skin cancer. They are melanoma, basal cell carcinoma, and squamous cell carcinoma. Doing monthly skin checks is the best way to find new marks or skin changes. Follow the instructions below for checking your skin.   The ABCDEs of checking moles for melanoma   Check your moles or growths for signs of melanoma using ABCDE:   Asymmetry: the sides of the mole or growth don t match  Border: the edges are ragged, notched, or blurred  Color: the color within the mole or growth varies  Diameter: the mole or growth is larger than 6 mm (size of a pencil eraser)  Evolving: the size, shape, or color of the mole or growth is changing (evolving is not shown in the images below)    Checking for other types of skin cancer  Basal cell carcinoma or squamous cell carcinoma have symptoms such as:     A spot or mole that looks different from all other marks on your skin  Changes in how an area feels, such as itching, tenderness, or pain  Changes in the skin's surface, such as oozing, bleeding, or scaliness  A sore that does not heal  New swelling or redness beyond  the border of a mole    Who s at risk?  Anyone can get skin cancer. But you are at greater risk if you have:   Fair skin, light-colored hair, or light-colored eyes  Many moles or abnormal moles on your skin  A history of sunburns from sunlight or tanning beds  A family history of skin cancer  A history of exposure to radiation or chemicals  A weakened immune system  If you have had skin cancer in the past, you are at risk for recurring skin cancer.   How to check your skin  Do your monthly skin checkups in front of a full-length mirror. Check all parts of your body, including your:   Head (ears, face, neck, and scalp)  Torso (front, back, and sides)  Arms (tops, undersides, upper, and lower armpits)  Hands (palms, backs, and fingers, including under the nails)  Buttocks and genitals  Legs (front, back, and sides)  Feet (tops, soles, toes, including under the nails, and between toes)  If you have a lot of moles, take digital photos of them each month. Make sure to take photos both up close and from a distance. These can help you see if any moles change over time.   Most skin changes are not cancer. But if you see any changes in your skin, call your doctor right away. Only he or she can diagnose a problem. If you have skin cancer, seeing your doctor can be the first step toward getting the treatment that could save your life.   [employer] last reviewed this educational content on 4/1/2019 2000-2020 [employer]. [address] [place] [address], [place] [address]. All rights reserved. This information is not intended as a substitute for professional medical care. Always follow your healthcare professional's instructions.       When should I call my doctor?  If you are worsening or not improving, please, contact us or seek urgent care as noted below.     Who should I call with questions (adults)?  [facility] (adult and pediatric): [phone]  [facility]  [place] (adult): [phone]  [facility] ([place], [place], [place] and [place]) [phone]  For urgent needs outside of business hours call the [facility] at [phone] and ask for the dermatology resident on call to be paged  If this is a medical emergency and you are unable to reach an ER, Call 911      If you need a prescription refill, please contact your pharmacy. Refills are approved or denied by our Physicians during normal business hours, Monday through Fridays  Per office policy, refills will not be granted if you have not been seen within the past year (or sooner depending on your child's condition)

## 2024-06-13 ENCOUNTER — PATIENT OUTREACH (OUTPATIENT)
Dept: CARE COORDINATION | Facility: CLINIC | Age: 69
End: 2024-06-13

## 2024-06-13 ENCOUNTER — OFFICE VISIT (OUTPATIENT)
Dept: FAMILY MEDICINE | Facility: CLINIC | Age: 69
End: 2024-06-13
Attending: INTERNAL MEDICINE
Payer: MEDICARE

## 2024-06-13 VITALS
DIASTOLIC BLOOD PRESSURE: 70 MMHG | RESPIRATION RATE: 16 BRPM | WEIGHT: 130.1 LBS | BODY MASS INDEX: 22.32 KG/M2 | SYSTOLIC BLOOD PRESSURE: 120 MMHG | HEART RATE: 97 BPM | OXYGEN SATURATION: 99 % | TEMPERATURE: 97.5 F

## 2024-06-13 DIAGNOSIS — M81.0 AGE-RELATED OSTEOPOROSIS WITHOUT CURRENT PATHOLOGICAL FRACTURE: ICD-10-CM

## 2024-06-13 DIAGNOSIS — Z00.00 ENCOUNTER FOR MEDICARE ANNUAL WELLNESS EXAM: Primary | ICD-10-CM

## 2024-06-13 DIAGNOSIS — F41.1 GENERALIZED ANXIETY DISORDER: ICD-10-CM

## 2024-06-13 DIAGNOSIS — J45.20 MILD INTERMITTENT ASTHMA WITHOUT COMPLICATION: ICD-10-CM

## 2024-06-13 DIAGNOSIS — K21.9 GASTROESOPHAGEAL REFLUX DISEASE WITHOUT ESOPHAGITIS: ICD-10-CM

## 2024-06-13 DIAGNOSIS — R79.89 ELEVATED TSH: ICD-10-CM

## 2024-06-13 DIAGNOSIS — R73.03 PREDIABETES: ICD-10-CM

## 2024-06-13 DIAGNOSIS — Z86.69 HISTORY OF BELL'S PALSY: ICD-10-CM

## 2024-06-13 DIAGNOSIS — E03.8 SUBCLINICAL HYPOTHYROIDISM: ICD-10-CM

## 2024-06-13 DIAGNOSIS — K44.9 HIATAL HERNIA: ICD-10-CM

## 2024-06-13 DIAGNOSIS — K31.7 MULTIPLE GASTRIC POLYPS: ICD-10-CM

## 2024-06-13 DIAGNOSIS — E78.5 HYPERLIPIDEMIA, UNSPECIFIED HYPERLIPIDEMIA TYPE: ICD-10-CM

## 2024-06-13 PROCEDURE — G0439 PPPS, SUBSEQ VISIT: HCPCS | Performed by: INTERNAL MEDICINE

## 2024-06-13 PROCEDURE — 99214 OFFICE O/P EST MOD 30 MIN: CPT | Mod: 25 | Performed by: INTERNAL MEDICINE

## 2024-06-13 ASSESSMENT — PAIN SCALES - GENERAL: PAINLEVEL: NO PAIN (0)

## 2024-06-13 NOTE — PATIENT INSTRUCTIONS
"You are due for covid booster   Schedule TSH in 2-3 months     Monitor your blood pressure once a week  at home.  Bring those readings on your next visit.  Notify us if your blood pressure readings consistently stays greater than 140/90.     Having high blood pressure puts you at risk for heart attack, stroke, kidney damage, and other serious problems.   High blood pressure doesn't usually cause symptoms, so people sometimes don't take it seriously  The medicines your doctor or nurse prescribes to treat high blood pressure can help reduce the risk of these problems and even help you live longer.    You have a lot of control over your blood pressure. To lower it:  ?Lose weight (if you are overweight)  ?Choose a diet low in fat and rich in fruits, vegetables, and low-fat dairy products  ?Reduce the amount of salt you eat  ?Do something active for at least 30 minutes a day on most days of the week  ?Cut down on alcohol (if you drink more than 2 alcoholic drinks per day)    Follow up in one year for physical   Seek sooner medical attention if there is any worsening of symptoms or problems.     Patient Education   Preventive Care Advice   This is general advice we often give to help people stay healthy. Your care team may have specific advice just for you. Please talk to your care team about your own preventive care needs.  Lifestyle  Exercise at least 150 minutes each week (30 minutes a day, 5 days a week).  Do muscle strengthening activities 2 days a week. These help control your weight and prevent disease.  No smoking.  Wear sunscreen to prevent skin cancer.  Have your home tested for radon every 2 to 5 years. Radon is a colorless, odorless gas that can harm your lungs. To learn more, go to www.health.[facility].mn.us and search for \"Radon in Homes.\"  Keep guns unloaded and locked up in a safe place like a safe or gun vault, or, use a gun lock and hide the keys. Always lock away bullets separately. To learn more, visit " "dps.mn.gov and search for \"safe gun storage.\"  Nutrition  Eat 5 or more servings of fruits and vegetables each day.  Try wheat bread, brown rice and whole grain pasta (instead of white bread, rice, and pasta).  Get enough calcium and vitamin D. Check the label on foods and aim for 100% of the RDA (recommended daily allowance).  Regular exams  Have a dental exam and cleaning every 6 months.  See your health care team every year to talk about:  Any changes in your health.  Any medicines your care team has prescribed.  Preventive care, family planning, and ways to prevent chronic diseases.  Shots (vaccines)   HPV shots (up to age 26), if you've never had them before.  Hepatitis B shots (up to age 59), if you've never had them before.  COVID-19 shot: Get this shot when it's due.  Flu shot: Get a flu shot every year.  Tetanus shot: Get a tetanus shot every 10 years.  Pneumococcal, hepatitis A, and RSV shots: Ask your care team if you need these based on your risk.  Shingles shot (for age 50 and up).  General health tests  Diabetes screening:  Starting at age 35, Get screened for diabetes at least every 3 years.  If you are younger than age 35, ask your care team if you should be screened for diabetes.  Cholesterol test: At age 39, start having a cholesterol test every 5 years, or more often if advised.  Bone density scan (DEXA): At age 50, ask your care team if you should have this scan for osteoporosis (brittle bones).  Hepatitis C: Get tested at least once in your life.  Abdominal aortic aneurysm screening: Talk to your doctor about having this screening if you:  Have ever smoked; and  Are biologically male; and  Are between the ages of 65 and 75.  STIs (sexually transmitted infections)  Before age 24: Ask your care team if you should be screened for STIs.  After age 24: Get screened for STIs if you're at risk. You are at risk for STIs (including HIV) if:  You are sexually active with more than one person.  You don't " use condoms every time.  You or a partner was diagnosed with a sexually transmitted infection.  If you are at risk for HIV, ask about PrEP medicine to prevent HIV.  Get tested for HIV at least once in your life, whether you are at risk for HIV or not.  Cancer screening tests  Cervical cancer screening: If you have a cervix, begin getting regular cervical cancer screening tests at age 21. Most people who have regular screenings with normal results can stop after age 65. Talk about this with your provider.  Breast cancer scan (mammogram): If you've ever had breasts, begin having regular mammograms starting at age 40. This is a scan to check for breast cancer.  Colon cancer screening: It is important to start screening for colon cancer at age 45.  Have a colonoscopy test every 10 years (or more often if you're at risk) Or, ask your provider about stool tests like a FIT test every year or Cologuard test every 3 years.  To learn more about your testing options, visit: www.[employer]/891879.pdf.  For help making a decision, visit: jake/dv73298.  Prostate cancer screening test: If you have a prostate and are age 55 to 69, ask your provider if you would benefit from a yearly prostate cancer screening test.  Lung cancer screening: If you are a current or former smoker age 50 to 80, ask your care team if ongoing lung cancer screenings are right for you.  For informational purposes only. Not to replace the advice of your health care provider. Copyright   2023 [facility]. All rights reserved. Clinically reviewed by the [facility] Transitions Program. [employer] [id number] - REV 04/24.

## 2024-06-13 NOTE — PROGRESS NOTES
Preventive Care Visit  [facility]  Tammy Boyer MD, Internal Medicine  Jun 13, 2024      Beena was seen today for physical.    Diagnoses and all orders for this visit:    Encounter for Medicare annual wellness exam  Last colonoscopy 7/2017  Last DEXA 1/2024 showed osteopenia, takes adequate calcium and vitamin D.  Last mammo 1/2024 was nl  Discussed lab results 5/30/2024.  Discussed [facility] Covid booster guidelines.  Declined Covid booster today, plans on receiving in the fall.    Mild intermittent asthma without complication  Uses Flovent inhaler    Multiple gastric polyps  Seen on endoscopy    Prediabetes  Will continue to monitor    Hyperlipidemia, unspecified hyperlipidemia type  Total cholesterol elevated at 214 5/30/2024.    Generalized anxiety disorder  Tapering off citalopram on her own accord, but continued on 5mg for awhile and now she's taking 1/2 tablet. She's been feeling well.    Gastroesophageal reflux disease without esophagitis  Occasionally takes Tums and other OTC medications.    Age-related osteoporosis without current pathological fracture  Receives IV Reclast, has received 2 injections, would like to skip injection this year b/c her bone density improved this year.    Hiatal hernia  Shown in endoscopy    Elevated TSH  Will complete TSH in 2 months.    Subclinical hypothyroidism  -     TSH with free T4 reflex; Future    History of Bell's palsy  Partial facial paralysis noticed during exam today.    Other orders  -     PRIMARY CARE FOLLOW-UP SCHEDULING  -     PRIMARY CARE FOLLOW-UP SCHEDULING; Future    Other  Follows dermatology, last appointment was earlier this week. She completes full body ca screenings every 2 years.  H.o vasovagal syncope  Reported vasovagal syncope episode on Tuesday 6/11, she hit her mouth, lost a tooth and saw her dentist, but otherwise she's doing okay. Advised patient to alert PCP if this happens again.  BP elevated at 145/75, rechecked at 120/70.  Sometimes monitors BP at home, reported it's well controlled. Reported white coat anxiety.      Subjective   Beena is a 68 year old, presenting for the following:  Physical        Health Care Directive  Patient does not have a Health Care Directive or Living Will: Patient states has Advance Directive and will bring in a copy to clinic.    HPI  Beena is a 68 year old, presenting for the following:  Physical        6/8/2024   General Health   How would you rate your overall physical health? Good   Feel stress (tense, anxious, or unable to sleep) Only a little   (!) STRESS CONCERN      6/8/2024   Nutrition   Diet: Regular (no restrictions)    Gluten-free/reduced         6/8/2024   Exercise   Days per week of moderate/strenous exercise 4 days   Average minutes spent exercising at this level 60 min         6/8/2024   Social Factors   Frequency of gathering with friends or relatives More than three times a week   Worry food won't last until get money to buy more No   Food not last or not have enough money for food? No   Do you have housing?  Yes   Are you worried about losing your housing? No   Lack of transportation? No   Unable to get utilities (heat,electricity)? No         6/8/2024   Fall Risk   Fallen 2 or more times in the past year? No   Trouble with walking or balance? No          6/8/2024   Activities of Daily Living- Home Safety   Needs help with the following daily activites None of the above   Safety concerns in the home None of the above         6/8/2024   Dental   Dentist two times every year? Yes         6/8/2024   Hearing Screening   Hearing concerns? None of the above         6/8/2024   Driving Risk Screening   Patient/family members have concerns about driving No         6/8/2024   General Alertness/Fatigue Screening   Have you been more tired than usual lately? No         6/8/2024   Urinary Incontinence Screening   Bothered by leaking urine in past 6 months No         6/8/2024   TB Screening   Were  you born outside of the [place]? Yes         Today's PHQ-2 Score:       6/12/2024     4:04 PM   PHQ-2 ( 1999 [employer])   Q1: Little interest or pleasure in doing things 0   Q2: Feeling down, depressed or hopeless 0   PHQ-2 Score 0   Q1: Little interest or pleasure in doing things Not at all   Q2: Feeling down, depressed or hopeless Not at all   PHQ-2 Score 0           6/8/2024   Substance Use   Alcohol more than 3/day or more than 7/wk No   Do you have a current opioid prescription? No   How severe/bad is pain from 1 to 10? 0/10 (No Pain)   Do you use any other substances recreationally? No     Social History     Tobacco Use    Smoking status: Never    Smokeless tobacco: Never   Vaping Use    Vaping status: Never Used   Substance Use Topics    Alcohol use: Not Currently    Drug use: Never           1/16/2024   LAST FHS-7 RESULTS   1st degree relative breast or ovarian cancer No   Any relative bilateral breast cancer No   Any male have breast cancer No   Any ONE woman have BOTH breast AND ovarian cancer No   Any woman with breast cancer before 50yrs No   2 or more relatives with breast AND/OR ovarian cancer No   2 or more relatives with breast AND/OR bowel cancer No        Mammogram Screening - Mammogram every 1-2 years updated in Health Maintenance based on mutual decision making      History of abnormal Pap smear: No, patient no longer requires pap smear d/t age status.     ASCVD Risk   The ASCVD Risk score (Araseli DK, et al., 2019) failed to calculate for the following reasons:    The valid HDL cholesterol range is 20 to 100 mg/dL        Reviewed and updated as needed this visit by Provider                  Current providers sharing in care for this patient include:  Patient Care Team:  Tammy Boyer MD as PCP - General (Internal Medicine)  Dereje Kahn MD as MD (Gastroenterology)  Tammy Boyer MD as Assigned PCP  Ines Liu [facility]AVidalC as Physician Assistant (Dermatology)    The following health  "maintenance items are reviewed in Epic and correct as of today:  Health Maintenance   Topic Date Due    COVID-19 Vaccine (8 - 2023-24 season) 01/25/2024    ANNUAL REVIEW OF HM ORDERS  12/11/2024    ASTHMA CONTROL TEST  12/13/2024    MAMMO SCREENING  01/16/2025    LIPID  05/30/2025    MEDICARE ANNUAL WELLNESS VISIT  06/13/2025    ASTHMA ACTION PLAN  06/13/2025    FALL RISK ASSESSMENT  06/13/2025    GLUCOSE  05/30/2027    COLORECTAL CANCER SCREENING  07/17/2027    ADVANCE CARE PLANNING  06/13/2029    DTAP/TDAP/TD IMMUNIZATION (3 - Td or Tdap) 04/08/2034    DEXA  01/16/2039    HEPATITIS C SCREENING  Completed    PHQ-2 (once per calendar year)  Completed    INFLUENZA VACCINE  Completed    Pneumococcal Vaccine: 65+ Years  Completed    ZOSTER IMMUNIZATION  Completed    RSV VACCINE (Pregnancy & 60+)  Completed    IPV IMMUNIZATION  Aged Out    HPV IMMUNIZATION  Aged Out    MENINGITIS IMMUNIZATION  Aged Out    RSV MONOCLONAL ANTIBODY  Aged Out       Review of Systems  Constitutional, HEENT, cardiovascular, pulmonary, GI, , musculoskeletal, neuro, skin, endocrine and psych systems are negative, except as otherwise noted.     Objective    Exam  /70 (BP Location: Right arm, Patient Position: Sitting)   Pulse 97   Temp 97.5  F (36.4  C) (Temporal)   Resp 16   Wt 59 kg (130 lb 1.6 oz)   SpO2 99%   BMI 22.32 kg/m     Estimated body mass index is 22.32 kg/m  as calculated from the following:    Height as of 6/19/23: 1.626 m (5' 4.02\").    Weight as of this encounter: 59 kg (130 lb 1.6 oz).    Physical Exam  GENERAL: alert and no distress  EYES: Eyes grossly normal to inspection, PERRL and conjunctivae and sclerae normal  HENT: ear canals and TM's normal, nose and mouth without ulcers or lesions flattened nasal fold on right side 2  NECK: no adenopathy, no asymmetry, masses, or scars  RESP: lungs clear to auscultation - no rales, rhonchi or wheezes  BREAST: normal without masses, tenderness or nipple discharge and no " palpable axillary masses or adenopathy  CV: regular rate and rhythm, normal S1 S2, no S3 or S4, no murmur, click or rub, no peripheral edema  ABDOMEN: soft, nontender, no hepatosplenomegaly, no masses and bowel sounds normal  MS: no gross musculoskeletal defects noted, no edema  SKIN: no suspicious lesions or rashes  She has numerous  freckles  NEURO: Normal strength and tone, mentation intact and speech normal  PSYCH: mentation appears normal, affect normal/bright  LYMPH: no cervical, supraclavicular, axillary, or inguinal adenopathy    Labs pending         6/13/2024   Mini Cog   Clock Draw Score 2 Normal   3 Item Recall 3 objects recalled   Mini Cog Total Score 5            Signed Electronically by: Tammy Boyer MD    This document serves as a record of the services and decisions personally performed and made by Dr. Byoer. It was created on her behalf by Deloris Bridges, a trained medical scribe. The creation of this document is based the provider's statements to the medical scribe.

## 2024-07-03 RX ORDER — MEPERIDINE HYDROCHLORIDE 25 MG/ML
25 INJECTION INTRAMUSCULAR; INTRAVENOUS; SUBCUTANEOUS EVERY 30 MIN PRN
OUTPATIENT
Start: 2024-07-03

## 2024-07-03 RX ORDER — METHYLPREDNISOLONE SODIUM SUCCINATE 125 MG/2ML
125 INJECTION, POWDER, LYOPHILIZED, FOR SOLUTION INTRAMUSCULAR; INTRAVENOUS
Start: 2024-07-03

## 2024-07-03 RX ORDER — ALBUTEROL SULFATE 0.83 MG/ML
2.5 SOLUTION RESPIRATORY (INHALATION)
OUTPATIENT
Start: 2024-07-03

## 2024-07-03 RX ORDER — DIPHENHYDRAMINE HYDROCHLORIDE 50 MG/ML
50 INJECTION INTRAMUSCULAR; INTRAVENOUS
Start: 2024-07-03

## 2024-07-03 RX ORDER — ALBUTEROL SULFATE 90 UG/1
1-2 AEROSOL, METERED RESPIRATORY (INHALATION)
Start: 2024-07-03

## 2024-07-03 RX ORDER — EPINEPHRINE 1 MG/ML
0.3 INJECTION, SOLUTION, CONCENTRATE INTRAVENOUS EVERY 5 MIN PRN
OUTPATIENT
Start: 2024-07-03

## 2024-07-03 RX ORDER — HEPARIN SODIUM,PORCINE 10 UNIT/ML
5 VIAL (ML) INTRAVENOUS
OUTPATIENT
Start: 2024-07-03

## 2024-11-12 ENCOUNTER — LAB (OUTPATIENT)
Dept: LAB | Facility: CLINIC | Age: 69
End: 2024-11-12
Payer: MEDICARE

## 2024-11-12 DIAGNOSIS — E05.90 SUBCLINICAL HYPERTHYROIDISM: ICD-10-CM

## 2024-11-12 DIAGNOSIS — R79.89 ELEVATED TSH: ICD-10-CM

## 2024-11-12 LAB — TSH SERPL DL<=0.005 MIU/L-ACNC: 3.03 UIU/ML (ref 0.3–4.2)

## 2024-11-12 PROCEDURE — 84443 ASSAY THYROID STIM HORMONE: CPT

## 2024-11-12 PROCEDURE — 36415 COLL VENOUS BLD VENIPUNCTURE: CPT

## 2024-11-12 NOTE — RESULT ENCOUNTER NOTE
Araceli Hargrove    This is to inform you regarding your test result.    TSH which is thyroid hormone is normal.      Sincerely,      Dr.Nasima Merlin MD,FACP

## 2024-12-17 ENCOUNTER — PATIENT OUTREACH (OUTPATIENT)
Dept: CARE COORDINATION | Facility: CLINIC | Age: 69
End: 2024-12-17
Payer: MEDICARE

## 2025-01-24 ENCOUNTER — HOSPITAL ENCOUNTER (OUTPATIENT)
Dept: MAMMOGRAPHY | Facility: CLINIC | Age: 70
Discharge: HOME | End: 2025-01-24
Attending: INTERNAL MEDICINE | Admitting: INTERNAL MEDICINE
Payer: MEDICARE

## 2025-01-24 DIAGNOSIS — Z12.31 VISIT FOR SCREENING MAMMOGRAM: ICD-10-CM

## 2025-01-24 PROCEDURE — 77063 BREAST TOMOSYNTHESIS BI: CPT

## 2025-04-07 ENCOUNTER — ANCILLARY PROCEDURE (OUTPATIENT)
Dept: GENERAL RADIOLOGY | Facility: CLINIC | Age: 70
End: 2025-04-07
Payer: MEDICARE

## 2025-04-07 ENCOUNTER — OFFICE VISIT (OUTPATIENT)
Dept: URGENT CARE | Facility: URGENT CARE | Age: 70
End: 2025-04-07
Payer: MEDICARE

## 2025-04-07 VITALS
RESPIRATION RATE: 16 BRPM | OXYGEN SATURATION: 95 % | DIASTOLIC BLOOD PRESSURE: 90 MMHG | BODY MASS INDEX: 21.1 KG/M2 | WEIGHT: 123 LBS | HEART RATE: 114 BPM | TEMPERATURE: 99.7 F | SYSTOLIC BLOOD PRESSURE: 158 MMHG

## 2025-04-07 DIAGNOSIS — R05.1 ACUTE COUGH: ICD-10-CM

## 2025-04-07 DIAGNOSIS — J18.9 PNEUMONIA OF RIGHT LOWER LOBE DUE TO INFECTIOUS ORGANISM: Primary | ICD-10-CM

## 2025-04-07 PROCEDURE — 3080F DIAST BP >= 90 MM HG: CPT

## 2025-04-07 PROCEDURE — 3077F SYST BP >= 140 MM HG: CPT

## 2025-04-07 PROCEDURE — 71046 X-RAY EXAM CHEST 2 VIEWS: CPT | Mod: TC | Performed by: RADIOLOGY

## 2025-04-07 PROCEDURE — 99213 OFFICE O/P EST LOW 20 MIN: CPT

## 2025-04-07 RX ORDER — AZITHROMYCIN 250 MG/1
TABLET, FILM COATED ORAL
Qty: 6 TABLET | Refills: 0 | Status: SHIPPED | OUTPATIENT
Start: 2025-04-07 | End: 2025-04-12

## 2025-04-07 NOTE — PATIENT INSTRUCTIONS
Chest x-ray shows the following per the radiologist report: Focal airspace consolidation is seen in the right lower lung concerning for pneumonia. Recommend radiographic follow-up to ensure resolution. If findings remain persistent, suggest CT exam. Normal cardiomediastinal silhouette. Mild degenerative changes are seen in the spine.  Take the antibiotic as prescribed and finish the full course even if symptoms improve.  Follow-up with your primary care provider in 7 to 10 days for recheck.  Go to the emergency department with any new or worsening symptoms.

## 2025-04-07 NOTE — PROGRESS NOTES
Urgent Care Clinic Visit    Chief Complaint   Patient presents with    Urgent Care     - Cough  - Fever (100F)  - Insomnia  - Not Eating  - Diarrhea  - Fatigue  - Just Came back from [place] on Tues last week (When symptoms began)               4/7/2025     1:14 PM   Additional Questions   Roomed by Arden TSANG   Accompanied by None     Pre-Provider Visit Orders- Influenza  Is this currently Influenza testing season?:  No   Does the patient present with a fever and either bodyaches, fatigue, or cough that have been present less than 48 hours? No

## 2025-04-11 ASSESSMENT — ASTHMA QUESTIONNAIRES
ACT_TOTALSCORE: 23
QUESTION_3 LAST FOUR WEEKS HOW OFTEN DID YOUR ASTHMA SYMPTOMS (WHEEZING, COUGHING, SHORTNESS OF BREATH, CHEST TIGHTNESS OR PAIN) WAKE YOU UP AT NIGHT OR EARLIER THAN USUAL IN THE MORNING: NOT AT ALL
QUESTION_5 LAST FOUR WEEKS HOW WOULD YOU RATE YOUR ASTHMA CONTROL: COMPLETELY CONTROLLED
QUESTION_2 LAST FOUR WEEKS HOW OFTEN HAVE YOU HAD SHORTNESS OF BREATH: NOT AT ALL
QUESTION_1 LAST FOUR WEEKS HOW MUCH OF THE TIME DID YOUR ASTHMA KEEP YOU FROM GETTING AS MUCH DONE AT WORK, SCHOOL OR AT HOME: NONE OF THE TIME
QUESTION_4 LAST FOUR WEEKS HOW OFTEN HAVE YOU USED YOUR RESCUE INHALER OR NEBULIZER MEDICATION (SUCH AS ALBUTEROL): TWO OR THREE TIMES PER WEEK

## 2025-04-14 ENCOUNTER — ANCILLARY PROCEDURE (OUTPATIENT)
Dept: GENERAL RADIOLOGY | Facility: CLINIC | Age: 70
End: 2025-04-14
Payer: MEDICARE

## 2025-04-14 ENCOUNTER — OFFICE VISIT (OUTPATIENT)
Dept: FAMILY MEDICINE | Facility: CLINIC | Age: 70
End: 2025-04-14
Payer: MEDICARE

## 2025-04-14 VITALS
BODY MASS INDEX: 20.88 KG/M2 | TEMPERATURE: 98.6 F | HEART RATE: 103 BPM | WEIGHT: 122.3 LBS | HEIGHT: 64 IN | RESPIRATION RATE: 16 BRPM | OXYGEN SATURATION: 98 % | DIASTOLIC BLOOD PRESSURE: 83 MMHG | SYSTOLIC BLOOD PRESSURE: 137 MMHG

## 2025-04-14 DIAGNOSIS — J18.9 PNEUMONIA OF RIGHT LOWER LOBE DUE TO INFECTIOUS ORGANISM: ICD-10-CM

## 2025-04-14 DIAGNOSIS — J18.9 PNEUMONIA OF RIGHT LOWER LOBE DUE TO INFECTIOUS ORGANISM: Primary | ICD-10-CM

## 2025-04-14 PROCEDURE — 99213 OFFICE O/P EST LOW 20 MIN: CPT

## 2025-04-14 PROCEDURE — 1126F AMNT PAIN NOTED NONE PRSNT: CPT

## 2025-04-14 PROCEDURE — 3079F DIAST BP 80-89 MM HG: CPT

## 2025-04-14 PROCEDURE — 3075F SYST BP GE 130 - 139MM HG: CPT

## 2025-04-14 PROCEDURE — 71046 X-RAY EXAM CHEST 2 VIEWS: CPT | Mod: TC | Performed by: RADIOLOGY

## 2025-04-14 PROCEDURE — G2211 COMPLEX E/M VISIT ADD ON: HCPCS

## 2025-04-14 ASSESSMENT — PAIN SCALES - GENERAL: PAINLEVEL_OUTOF10: NO PAIN (0)

## 2025-04-14 NOTE — PROGRESS NOTES
"  Assessment & Plan     Pneumonia of right lower lobe due to infectious organism  Recently diagnosed with RLL pneumonia, treated x5 days with azithromycin and Augmentin. Overall feeling much better. No fevers. Cough improved, no SOB. Continues to have some fatigue. Physical exam reassuring and discussed that clinically she is doing better. However, was under the impression she would be getting a repeat CXR so she would like to complete this.  - XR Chest 2 Views; Future        MED REC REQUIRED  Post Medication Reconciliation Status: discharge medications reconciled, continue medications without change    FUTURE APPOINTMENTS:       - Follow-up for annual visit or as needed    Subjective   Beena is a 69 year old, presenting for the following health issues:  ER F/U        4/14/2025     9:50 AM   Additional Questions   Roomed by Kathy CASTRO MA     Here for  follow-up for pneumonia  Overall feels much better  Just completed her course of antibiotics  Fevers had resolved  Still having some fatigue  Occasional cough but largely improved. Feels it is mostly related to post-nasal drip  Alternates between ibuprofen and tylenol  Hx of exercise-induced asthma, uses Flovent BID. Reports asthma symptoms generally well controlled. Did have to use albuterol a couple times with current symptoms            ED/UC Followup:    Facility:  [facility] [place]  Date of visit: 4/7/25  Reason for visit: Pneumonia of right lower lobe due to infectious organism  Acute cough  Current Status: At home        Review of Systems  Constitutional, HEENT, cardiovascular, pulmonary, gi and gu systems are negative, except as otherwise noted.      Objective    /83 (BP Location: Left arm, Patient Position: Sitting, Cuff Size: Adult Regular)   Pulse 103   Temp 98.6  F (37  C) (Temporal)   Resp 16   Ht 1.626 m (5' 4.02\")   Wt 55.5 kg (122 lb 4.8 oz)   SpO2 98%   BMI 20.98 kg/m    Body mass index is 20.98 kg/m .  Physical " Exam  Vitals reviewed.   HENT:      Head: Normocephalic.   Eyes:      Pupils: Pupils are equal, round, and reactive to light.   Cardiovascular:      Rate and Rhythm: Normal rate and regular rhythm.      Pulses: Normal pulses.      Heart sounds: Normal heart sounds. No murmur heard.  Pulmonary:      Effort: Pulmonary effort is normal. No respiratory distress.      Breath sounds: Normal breath sounds and air entry. No stridor or decreased air movement. No wheezing, rhonchi or rales.   Musculoskeletal:         General: Normal range of motion.      Cervical back: Normal range of motion and neck supple.   Skin:     General: Skin is warm and dry.   Neurological:      Mental Status: She is alert and oriented to person, place, and time.   Psychiatric:         Mood and Affect: Mood normal.         Behavior: Behavior normal.            XR Chest 2 Views    Result Date: 4/7/2025  EXAM: XR CHEST 2 VIEWS LOCATION: [facility] [place] DATE: 4/7/2025 INDICATION:  Acute cough COMPARISON: None.     IMPRESSION: Focal airspace consolidation is seen in the right lower lung concerning for pneumonia. Recommend radiographic follow-up to ensure resolution. If findings remain persistent, suggest CT exam. Normal cardiomediastinal silhouette. Mild degenerative changes are seen in the spine.           Signed Electronically by: Kayy Angulo, DNP, APRN, CNP

## 2025-04-16 NOTE — RESULT ENCOUNTER NOTE
Keyur Hargrove,    Your chest x-ray still showed some patchy opacities to the right lung base, which is consistent with your previous infection however it does look improved. This can sometimes take a couple weeks to a month to clear on imaging which is generally why early repeat test-of-cure isn't generally required. Clinically, your symptoms have significantly improved and for the most part resolved and you were appropriately treated with antibiotics so at this time, I would actually not recommend further antibiotics unless your symptoms return. Please keep us posted though with how you are doing!    Let me know if you have any questions or concerns,     Kayy Angulo, DNP, APRN, CNP

## 2025-06-04 ENCOUNTER — MYC REFILL (OUTPATIENT)
Dept: FAMILY MEDICINE | Facility: CLINIC | Age: 70
End: 2025-06-04
Payer: MEDICARE

## 2025-06-04 DIAGNOSIS — J45.20 MILD INTERMITTENT ASTHMA WITHOUT COMPLICATION: ICD-10-CM

## 2025-06-04 RX ORDER — FLUTICASONE PROPIONATE 110 UG/1
1 AEROSOL, METERED RESPIRATORY (INHALATION) 2 TIMES DAILY
Qty: 36 G | Refills: 1 | Status: SHIPPED | OUTPATIENT
Start: 2025-06-04

## 2025-06-05 ENCOUNTER — TELEPHONE (OUTPATIENT)
Dept: FAMILY MEDICINE | Facility: CLINIC | Age: 70
End: 2025-06-05

## 2025-06-05 NOTE — CONFIDENTIAL NOTE
----- Message from JOEL Omalley sent at 6/1/2019  9:36 AM CDT -----  Negative strep culture- please notify patient Prior Authorization Retail Medication Request    Medication/Dose: fluticasone (FLOVENT HFA) 110 MCG/ACT inhaler   ICD code (if different than what is on RX):  Previously Tried and Failed:  Rationale:    Insurance Name: unknown  Insurance ID: [id number]    Pharmacy Information (if different than what is on RX)  [facility]  Phone: [phone]    Please include previous medications tried and failed.  Please ask insurance for medications on formulary.

## 2025-06-06 NOTE — TELEPHONE ENCOUNTER
Prior Authorization Approval    Medication: FLUTICASONE PROPIONATE  MCG/ACT IN AERO  Authorization Effective Date: 5/23/2025  Authorization Expiration Date: 5/23/2099  Approved Dose/Quantity:   Reference #:     Insurance Company: [employer] - Phone [phone] Fax [phone]  Expected CoPay: $    CoPay Card Available:      Financial Assistance Needed:   Which Pharmacy is filling the prescription: [id number] - [facility] [address] AT  [address] [facility]  Pharmacy Notified: Yes  Patient Notified: **Instructed pharmacy to notify patient when script is ready to /ship.**

## 2025-06-06 NOTE — TELEPHONE ENCOUNTER
Retail Pharmacy Prior Authorization Team   Phone: [phone]    PA Initiation    Medication: FLUTICASONE PROPIONATE  MCG/ACT IN AERO  Insurance Company: [employer] - Phone [phone] Fax [phone]  Pharmacy Filling the Rx: [facility] #[id number] - [place], [place] - [address] & [facility]  Filling Pharmacy Phone: [phone]  Filling Pharmacy Fax:    Start Date: 6/6/2025    [id number]

## 2025-07-09 SDOH — HEALTH STABILITY: PHYSICAL HEALTH: ON AVERAGE, HOW MANY MINUTES DO YOU ENGAGE IN EXERCISE AT THIS LEVEL?: 50 MIN

## 2025-07-09 SDOH — HEALTH STABILITY: PHYSICAL HEALTH: ON AVERAGE, HOW MANY DAYS PER WEEK DO YOU ENGAGE IN MODERATE TO STRENUOUS EXERCISE (LIKE A BRISK WALK)?: 5 DAYS

## 2025-07-09 ASSESSMENT — ASTHMA QUESTIONNAIRES
ACT_TOTALSCORE: 25
QUESTION_3 LAST FOUR WEEKS HOW OFTEN DID YOUR ASTHMA SYMPTOMS (WHEEZING, COUGHING, SHORTNESS OF BREATH, CHEST TIGHTNESS OR PAIN) WAKE YOU UP AT NIGHT OR EARLIER THAN USUAL IN THE MORNING: NOT AT ALL
QUESTION_4 LAST FOUR WEEKS HOW OFTEN HAVE YOU USED YOUR RESCUE INHALER OR NEBULIZER MEDICATION (SUCH AS ALBUTEROL): NOT AT ALL
QUESTION_5 LAST FOUR WEEKS HOW WOULD YOU RATE YOUR ASTHMA CONTROL: COMPLETELY CONTROLLED
QUESTION_1 LAST FOUR WEEKS HOW MUCH OF THE TIME DID YOUR ASTHMA KEEP YOU FROM GETTING AS MUCH DONE AT WORK, SCHOOL OR AT HOME: NONE OF THE TIME
QUESTION_2 LAST FOUR WEEKS HOW OFTEN HAVE YOU HAD SHORTNESS OF BREATH: NOT AT ALL

## 2025-07-09 ASSESSMENT — SOCIAL DETERMINANTS OF HEALTH (SDOH): HOW OFTEN DO YOU GET TOGETHER WITH FRIENDS OR RELATIVES?: MORE THAN THREE TIMES A WEEK

## 2025-07-14 ENCOUNTER — OFFICE VISIT (OUTPATIENT)
Dept: FAMILY MEDICINE | Facility: CLINIC | Age: 70
End: 2025-07-14
Attending: INTERNAL MEDICINE
Payer: MEDICARE

## 2025-07-14 ENCOUNTER — ANCILLARY PROCEDURE (OUTPATIENT)
Dept: GENERAL RADIOLOGY | Facility: CLINIC | Age: 70
End: 2025-07-14
Attending: INTERNAL MEDICINE
Payer: MEDICARE

## 2025-07-14 VITALS
TEMPERATURE: 98.2 F | DIASTOLIC BLOOD PRESSURE: 70 MMHG | RESPIRATION RATE: 16 BRPM | HEART RATE: 80 BPM | HEIGHT: 64 IN | SYSTOLIC BLOOD PRESSURE: 120 MMHG | WEIGHT: 124 LBS | BODY MASS INDEX: 21.17 KG/M2 | OXYGEN SATURATION: 98 %

## 2025-07-14 DIAGNOSIS — B37.0 THRUSH: ICD-10-CM

## 2025-07-14 DIAGNOSIS — Z87.01 HISTORY OF PNEUMONIA: ICD-10-CM

## 2025-07-14 DIAGNOSIS — K31.7 MULTIPLE GASTRIC POLYPS: ICD-10-CM

## 2025-07-14 DIAGNOSIS — Z86.69 HISTORY OF BELL'S PALSY: ICD-10-CM

## 2025-07-14 DIAGNOSIS — R73.03 PREDIABETES: ICD-10-CM

## 2025-07-14 DIAGNOSIS — J45.20 MILD INTERMITTENT ASTHMA WITHOUT COMPLICATION: ICD-10-CM

## 2025-07-14 DIAGNOSIS — Z79.899 MEDICATION MANAGEMENT: ICD-10-CM

## 2025-07-14 DIAGNOSIS — Z13.0 SCREENING FOR DEFICIENCY ANEMIA: ICD-10-CM

## 2025-07-14 DIAGNOSIS — E78.5 HYPERLIPIDEMIA, UNSPECIFIED HYPERLIPIDEMIA TYPE: ICD-10-CM

## 2025-07-14 DIAGNOSIS — E03.8 SUBCLINICAL HYPOTHYROIDISM: ICD-10-CM

## 2025-07-14 DIAGNOSIS — Z00.00 ENCOUNTER FOR MEDICARE ANNUAL WELLNESS EXAM: Primary | ICD-10-CM

## 2025-07-14 DIAGNOSIS — F41.1 GENERALIZED ANXIETY DISORDER: ICD-10-CM

## 2025-07-14 PROBLEM — F40.243 FLYING PHOBIA: Status: RESOLVED | Noted: 2022-12-16 | Resolved: 2025-07-14

## 2025-07-14 LAB
ALBUMIN SERPL BCG-MCNC: 4.3 G/DL (ref 3.5–5.2)
ALP SERPL-CCNC: 70 U/L (ref 40–150)
ALT SERPL W P-5'-P-CCNC: 17 U/L (ref 0–50)
ANION GAP SERPL CALCULATED.3IONS-SCNC: 10 MMOL/L (ref 7–15)
AST SERPL W P-5'-P-CCNC: 23 U/L (ref 0–45)
BILIRUB SERPL-MCNC: 0.7 MG/DL
BUN SERPL-MCNC: 14.6 MG/DL (ref 8–23)
CALCIUM SERPL-MCNC: 9.9 MG/DL (ref 8.8–10.4)
CHLORIDE SERPL-SCNC: 103 MMOL/L (ref 98–107)
CHOLEST SERPL-MCNC: 224 MG/DL
CREAT SERPL-MCNC: 0.69 MG/DL (ref 0.51–0.95)
EGFRCR SERPLBLD CKD-EPI 2021: >90 ML/MIN/1.73M2
ERYTHROCYTE [DISTWIDTH] IN BLOOD BY AUTOMATED COUNT: 13.6 % (ref 10–15)
FASTING STATUS PATIENT QL REPORTED: NO
FASTING STATUS PATIENT QL REPORTED: NO
GLUCOSE SERPL-MCNC: 100 MG/DL (ref 70–99)
HCO3 SERPL-SCNC: 26 MMOL/L (ref 22–29)
HCT VFR BLD AUTO: 42.9 % (ref 35–47)
HDLC SERPL-MCNC: 109 MG/DL
HGB BLD-MCNC: 13.6 G/DL (ref 11.7–15.7)
LDLC SERPL CALC-MCNC: 98 MG/DL
MCH RBC QN AUTO: 29.8 PG (ref 26.5–33)
MCHC RBC AUTO-ENTMCNC: 31.7 G/DL (ref 31.5–36.5)
MCV RBC AUTO: 94 FL (ref 78–100)
NONHDLC SERPL-MCNC: 115 MG/DL
PLATELET # BLD AUTO: 284 10E3/UL (ref 150–450)
POTASSIUM SERPL-SCNC: 3.9 MMOL/L (ref 3.4–5.3)
PROT SERPL-MCNC: 7.2 G/DL (ref 6.4–8.3)
RBC # BLD AUTO: 4.57 10E6/UL (ref 3.8–5.2)
SODIUM SERPL-SCNC: 139 MMOL/L (ref 135–145)
TRIGL SERPL-MCNC: 86 MG/DL
TSH SERPL DL<=0.005 MIU/L-ACNC: 2.19 UIU/ML (ref 0.3–4.2)
WBC # BLD AUTO: 7.8 10E3/UL (ref 4–11)

## 2025-07-14 PROCEDURE — G0439 PPPS, SUBSEQ VISIT: HCPCS | Performed by: INTERNAL MEDICINE

## 2025-07-14 PROCEDURE — 85027 COMPLETE CBC AUTOMATED: CPT | Performed by: INTERNAL MEDICINE

## 2025-07-14 PROCEDURE — 1126F AMNT PAIN NOTED NONE PRSNT: CPT | Performed by: INTERNAL MEDICINE

## 2025-07-14 PROCEDURE — 36415 COLL VENOUS BLD VENIPUNCTURE: CPT | Performed by: INTERNAL MEDICINE

## 2025-07-14 PROCEDURE — 84443 ASSAY THYROID STIM HORMONE: CPT | Performed by: INTERNAL MEDICINE

## 2025-07-14 PROCEDURE — 80061 LIPID PANEL: CPT | Performed by: INTERNAL MEDICINE

## 2025-07-14 PROCEDURE — 80053 COMPREHEN METABOLIC PANEL: CPT | Performed by: INTERNAL MEDICINE

## 2025-07-14 PROCEDURE — 3078F DIAST BP <80 MM HG: CPT | Performed by: INTERNAL MEDICINE

## 2025-07-14 PROCEDURE — 99213 OFFICE O/P EST LOW 20 MIN: CPT | Mod: 25 | Performed by: INTERNAL MEDICINE

## 2025-07-14 PROCEDURE — 71046 X-RAY EXAM CHEST 2 VIEWS: CPT | Mod: TC | Performed by: RADIOLOGY

## 2025-07-14 PROCEDURE — 3074F SYST BP LT 130 MM HG: CPT | Performed by: INTERNAL MEDICINE

## 2025-07-14 PROCEDURE — G2211 COMPLEX E/M VISIT ADD ON: HCPCS | Performed by: INTERNAL MEDICINE

## 2025-07-14 RX ORDER — MEPERIDINE HYDROCHLORIDE 25 MG/ML
25 INJECTION INTRAMUSCULAR; INTRAVENOUS; SUBCUTANEOUS EVERY 30 MIN PRN
OUTPATIENT
Start: 2025-07-14

## 2025-07-14 RX ORDER — ALBUTEROL SULFATE 0.83 MG/ML
2.5 SOLUTION RESPIRATORY (INHALATION)
OUTPATIENT
Start: 2025-07-14

## 2025-07-14 RX ORDER — METHYLPREDNISOLONE SODIUM SUCCINATE 125 MG/2ML
125 INJECTION INTRAMUSCULAR; INTRAVENOUS
Start: 2025-07-14

## 2025-07-14 RX ORDER — NYSTATIN 100000 [USP'U]/ML
500000 SUSPENSION ORAL 4 TIMES DAILY PRN
Qty: 60 ML | Refills: 1 | Status: SHIPPED | OUTPATIENT
Start: 2025-07-14

## 2025-07-14 RX ORDER — FLUTICASONE PROPIONATE 110 UG/1
1 AEROSOL, METERED RESPIRATORY (INHALATION) 2 TIMES DAILY
Qty: 36 G | Refills: 3 | Status: SHIPPED | OUTPATIENT
Start: 2025-07-14

## 2025-07-14 RX ORDER — DIPHENHYDRAMINE HYDROCHLORIDE 50 MG/ML
50 INJECTION, SOLUTION INTRAMUSCULAR; INTRAVENOUS
Start: 2025-07-14

## 2025-07-14 RX ORDER — EPINEPHRINE 1 MG/ML
0.3 INJECTION, SOLUTION, CONCENTRATE INTRAVENOUS EVERY 5 MIN PRN
OUTPATIENT
Start: 2025-07-14

## 2025-07-14 RX ORDER — HEPARIN SODIUM,PORCINE 10 UNIT/ML
5 VIAL (ML) INTRAVENOUS
OUTPATIENT
Start: 2025-07-14

## 2025-07-14 RX ORDER — ALBUTEROL SULFATE 90 UG/1
1-2 INHALANT RESPIRATORY (INHALATION)
Start: 2025-07-14

## 2025-07-14 ASSESSMENT — PAIN SCALES - GENERAL: PAINLEVEL_OUTOF10: NO PAIN (0)

## 2025-07-14 NOTE — PATIENT INSTRUCTIONS
Please call at [phone], option 3, then option 2 to schedule your IV Reclast    Labs today    Follow up in one year for physical   Seek sooner medical attention if there is any worsening of symptoms or problems.           Patient Education   Preventive Care Advice   This is general advice given by our system to help you stay healthy. However, your care team may have specific advice just for you. Please talk to your care team about your preventive care needs.  Nutrition  Eat 5 or more servings of fruits and vegetables each day.  Try wheat bread, brown rice and whole grain pasta (instead of white bread, rice, and pasta).  Get enough calcium and vitamin D. Check the label on foods and aim for 100% of the RDA (recommended daily allowance).  Lifestyle  Exercise at least 150 minutes each week  (30 minutes a day, 5 days a week).  Do muscle strengthening activities 2 days a week. These help control your weight and prevent disease.  No smoking.  Wear sunscreen to prevent skin cancer.  Have a dental exam and cleaning every 6 months.  Yearly exams  See your health care team every year to talk about:  Any changes in your health.  Any medicines your care team has prescribed.  Preventive care, family planning, and ways to prevent chronic diseases.  Shots (vaccines)   HPV shots (up to age 26), if you've never had them before.  Hepatitis B shots (up to age 59), if you've never had them before.  COVID-19 shot: Get this shot when it's due.  Flu shot: Get a flu shot every year.  Tetanus shot: Get a tetanus shot every 10 years.  Pneumococcal, hepatitis A, and RSV shots: Ask your care team if you need these based on your risk.  Shingles shot (for age 50 and up)  General health tests  Diabetes screening:  Starting at age 35, Get screened for diabetes at least every 3 years.  If you are younger than age 35, ask your care team if you should be screened for diabetes.  Cholesterol test: At age 39, start having a cholesterol test  every 5 years, or more often if advised.  Bone density scan (DEXA): At age 50, ask your care team if you should have this scan for osteoporosis (brittle bones).  Hepatitis C: Get tested at least once in your life.  STIs (sexually transmitted infections)  Before age 24: Ask your care team if you should be screened for STIs.  After age 24: Get screened for STIs if you're at risk. You are at risk for STIs (including HIV) if:  You are sexually active with more than one person.  You don't use condoms every time.  You or a partner was diagnosed with a sexually transmitted infection.  If you are at risk for HIV, ask about PrEP medicine to prevent HIV.  Get tested for HIV at least once in your life, whether you are at risk for HIV or not.  Cancer screening tests  Cervical cancer screening: If you have a cervix, begin getting regular cervical cancer screening tests starting at age 21.  Breast cancer scan (mammogram): If you've ever had breasts, begin having regular mammograms starting at age 40. This is a scan to check for breast cancer.  Colon cancer screening: It is important to start screening for colon cancer at age 45.  Have a colonoscopy test every 10 years (or more often if you're at risk) Or, ask your provider about stool tests like a FIT test every year or Cologuard test every 3 years.  To learn more about your testing options, visit:   .  For help making a decision, visit:   https://bit.ly/dl80169.  Prostate cancer screening test: If you have a prostate, ask your care team if a prostate cancer screening test (PSA) at age 55 is right for you.  Lung cancer screening: If you are a current or former smoker ages 50 to 80, ask your care team if ongoing lung cancer screenings are right for you.  For informational purposes only. Not to replace the advice of your health care provider. Copyright   2023 [employer]. All rights reserved. Clinically reviewed by the [facility] Transitions Program. [employer]  119996 - REV 01/24.  Hearing Loss: Care Instructions  Overview     Hearing loss is a sudden or slow decrease in how well you hear. It can range from slight to profound. Permanent hearing loss can occur with aging. It also can happen when you are exposed long-term to loud noise. Examples include listening to loud music, riding motorcycles, or being around other loud machines.  Hearing loss can affect your work and home life. It can make you feel lonely or depressed. You may feel that you have lost your independence. But hearing aids and other devices can help you hear better and feel connected to others.  Follow-up care is a key part of your treatment and safety. Be sure to make and go to all appointments, and call your doctor if you are having problems. It's also a good idea to know your test results and keep a list of the medicines you take.  How can you care for yourself at home?  Avoid loud noises whenever possible. This helps keep your hearing from getting worse.  Always wear hearing protection around loud noises.  Wear a hearing aid as directed.  A professional can help you pick a hearing aid that will work best for you.  You can also get hearing aids over the counter for mild to moderate hearing loss.  Have hearing tests as your doctor suggests. They can show whether your hearing has changed. Your hearing aid may need to be adjusted.  Use other devices as needed. These may include:  Telephone amplifiers and hearing aids that can connect to a television, stereo, radio, or microphone.  Devices that use lights or vibrations. These alert you to the doorbell, a ringing telephone, or a baby monitor.  Television closed-captioning. This shows the words at the bottom of the screen. Most new TVs can do this.  TTY (text telephone). This lets you type messages back and forth on the telephone instead of talking or listening. These devices are also called TDD. When messages are typed on the keyboard, they are sent over the  "phone line to a receiving TTY. The message is shown on a monitor.  Use text messaging, social media, and email if it is hard for you to communicate by telephone.  Try to learn a listening technique called speechreading. It is not lipreading. You pay attention to people's gestures, expressions, posture, and tone of voice. These clues can help you understand what a person is saying. Face the person you are talking to, and have them face you. Make sure the lighting is good. You need to see the other person's face clearly.  Think about counseling if you need help to adjust to your hearing loss.  When should you call for help?  Watch closely for changes in your health, and be sure to contact your doctor if:    You think your hearing is getting worse.     You have new symptoms, such as dizziness or nausea.   Where can you learn more?  Go to https://www.[employer].net/patiented  Enter R798 in the search box to learn more about \"Hearing Loss: Care Instructions.\"  Current as of: October 27, 2024  Content Version: 14.5 2024-2025 [employer].   Care instructions adapted under license by your healthcare professional. If you have questions about a medical condition or this instruction, always ask your healthcare professional. [employer] disclaims any warranty or liability for your use of this information.       "

## 2025-07-14 NOTE — PROGRESS NOTES
Preventive Care Visit  [facility]  Tammy Boyer MD, Internal Medicine  Jul 14, 2025      Assessment & Plan     Beena was seen today for physical.    Diagnoses and all orders for this visit:    Encounter for Medicare annual wellness exam  Preventive health counseling was also done.  Counseled on healthy eating and physical activity  Last colonoscopy on 7/2017.   Last DEXA on 1/16/24 shows osteopenia.   Advised to take adequate calcium, vitamin D and do weight bearing exercises.   Pt is on IV reclast and has received 2 infusion.   Advised pt to receive 3rd IV reclast and can complete DEXA a year later.   Pt agreed to do this and orders were placed.   Last Mammo on 1/24/2025 which as negative  Pt would prefer to wait to receive Covid booster with Flu vaccine.   Pt states she has advanced directives and living will.     Hyperlipidemia, unspecified hyperlipidemia type  -     Lipid panel reflex to direct LDL Non-fasting; Future  Cholesterol on 5/30/24 was elevated at 214 mg/dL    Mild intermittent asthma without complication  -     fluticasone (FLOVENT HFA) 110 MCG/ACT inhaler; Inhale 1 puff into the lungs 2 times daily.  Uses Flovent inhaler     Subclinical hypothyroidism  -     TSH with free T4 reflex; Future    Prediabetes  Last A1c on 10/22/2018 was 5.6%    Multiple gastric polyps  Seen on endoscopy     Generalized anxiety disorder  Pt manages well without medication    History of Bell's palsy  Past history    Thrush  -     nystatin (MYCOSTATIN) 794775 UNIT/ML suspension; Take 5 mLs (500,000 Units) by mouth 4 times daily as needed (for flare up of thrush).  Advised pt to rinse mouth after using inhaler  Pt reports that she developed thrush d/t antibiotic use in past.   She requested to have nystatin on hand to treat future events.     History of pneumonia  -     XR Chest 2 Views; Future  Seen on 4/7/25 Chest X-ray  Advised pt to complete another X-ray to ensure abnormalities has cleared up.    She states symptoms from it resolved but she is still experiencing low energy    Medication management  -     Comprehensive metabolic panel; Future    Screening for deficiency anemia  -     CBC with platelets; Future    Other orders  -     PRIMARY CARE FOLLOW-UP SCHEDULING  -     sodium chloride (PF) 0.9% PF flush 3-20 mL  -     heparin lock flush 10 unit/mL injection 5 mL  -     diphenhydrAMINE (BENADRYL) injection 50 mg  -     famotidine (PEPCID) injection 20 mg  -     methylPREDNISolone Na Suc (solu-MEDROL) injection 125 mg  -     EPINEPHrine PF (ADRENALIN) injection 0.3 mg  -     sodium chloride 0.9% BOLUS 500 mL  -     albuterol (PROVENTIL HFA/VENTOLIN HFA) inhaler  -     albuterol (PROVENTIL) neb solution 2.5 mg  -     meperidine (DEMEROL) injection 25 mg    Other  120/78 home BP reading  BP was 139/81 and was rechecked at 120/70  Pt requested removal of flying phobia and valium from medication list since she no longer experiences.  (should this be left here or do you want to move it?).    Patient has been advised of split billing requirements and indicates understanding: Yes    Counseling  Appropriate preventive services were addressed with this patient via screening, questionnaire, or discussion as appropriate for fall prevention, nutrition, physical activity, Tobacco-use cessation, social engagement, weight loss and cognition.  Checklist reviewing preventive services available has been given to the patient.  Reviewed patient's diet, addressing concerns and/or questions.   The patient was provided with written information regarding signs of hearing loss.   Reviewed preventive health counseling, as reflected in patient instructions       Regular exercise       Healthy diet/nutrition       Osteoporosis prevention/bone health    Pt will f/up on 7/20/26    Subjective   Beena is a 69 year old, presenting for the following:  Physical     HPI  Beena is a 69 year old, presenting for the following:  Physical      Advance Care Planning    Patient has advanced directives         7/9/2025   General Health   How would you rate your overall physical health? Excellent   Feel stress (tense, anxious, or unable to sleep) Only a little   (!) STRESS CONCERN      7/9/2025   Nutrition   Diet: Regular (no restrictions)         7/9/2025   Exercise   Days per week of moderate/strenous exercise 5 days   Average minutes spent exercising at this level 50 min         7/9/2025   Social Factors   Frequency of gathering with friends or relatives More than three times a week   Worry food won't last until get money to buy more No   Food not last or not have enough money for food? No   Do you have housing? (Housing is defined as stable permanent housing and does not include staying outside in a car, in a tent, in an abandoned building, in an overnight shelter, or couch-surfing.) Yes   Are you worried about losing your housing? No   Lack of transportation? No   Unable to get utilities (heat,electricity)? No         7/9/2025   Fall Risk   Fallen 2 or more times in the past year? No   Trouble with walking or balance? No          7/9/2025   Activities of Daily Living- Home Safety   Needs help with the following daily activites None of the above   Safety concerns in the home None of the above         7/9/2025   Dental   Dentist two times every year? Yes         7/9/2025   Hearing Screening   Hearing concerns? (!) IT'S HARD TO FOLLOW A CONVERSATION IN A NOISY [employer]AURANT OR CROWDED ROOM.   Would you like a referral for hearing testing? No         7/9/2025   Driving Risk Screening   Patient/family members have concerns about driving No         7/9/2025   General Alertness/Fatigue Screening   Have you been more tired than usual lately? No         7/9/2025   Urinary Incontinence Screening   Bothered by leaking urine in past 6 months No         Today's PHQ-2 Score:       7/14/2025     2:12 PM   PHQ-2 ( 1999 [employer])   Q1: Little interest or pleasure in doing  things 0   Q2: Feeling down, depressed or hopeless 0   PHQ-2 Score 0    Q1: Little interest or pleasure in doing things Not at all   Q2: Feeling down, depressed or hopeless Not at all   PHQ-2 Score 0       Patient-reported         7/9/2025   Substance Use   Alcohol more than 3/day or more than 7/wk No   Do you have a current opioid prescription? No   How severe/bad is pain from 1 to 10? 0/10 (No Pain)   Do you use any other substances recreationally? No     Social History     Tobacco Use    Smoking status: Never    Smokeless tobacco: Never   Vaping Use    Vaping status: Never Used   Substance Use Topics    Alcohol use: Yes     Comment: Occasional    Drug use: Never           1/24/2025   LAST FHS-7 RESULTS   1st degree relative breast or ovarian cancer No   Any relative bilateral breast cancer No   Any male have breast cancer No   Any ONE woman have BOTH breast AND ovarian cancer No   Any woman with breast cancer before 50yrs No   2 or more relatives with breast AND/OR ovarian cancer No   2 or more relatives with breast AND/OR bowel cancer No          History of abnormal Pap smear: no per patient report      ASCVD Risk   The ASCVD Risk score (Araseli RAI, et al., 2019) failed to calculate for the following reasons:    The valid HDL cholesterol range is 20 to 100 mg/dL        Reviewed and updated as needed this visit by Provider                  Current providers sharing in care for this patient include:  Patient Care Team:  Tammy Boyer MD as PCP - General (Internal Medicine)  Dereje Kahn MD as MD (Gastroenterology)  Tammy Boyer MD as Assigned PCP  Ines Liu PA-C as Physician Assistant (Dermatology)  Ines Liu PA-C as Assigned Dermatology Provider    The following health maintenance items are reviewed in Epic and correct as of today:  Health Maintenance   Topic Date Due    COVID-19 VACCINE (9 - 2024-25 season) 03/12/2025    LIPID  05/30/2025    INFLUENZA VACCINE (1) 09/01/2025     "TSH W/FREE T4 REFLEX  11/12/2025    ASTHMA CONTROL TEST  01/14/2026    MAMMO SCREENING  01/24/2026    ANNUAL REVIEW OF HM ORDERS  04/14/2026    MEDICARE ANNUAL WELLNESS VISIT  07/14/2026    ASTHMA ACTION PLAN  07/14/2026    FALL RISK ASSESSMENT  07/14/2026    DIABETES SCREENING  05/30/2027    COLORECTAL CANCER SCREENING  07/17/2027    ADVANCE CARE PLANNING  06/13/2029    DTAP/TDAP/TD VACCINE (3 - Td or Tdap) 04/08/2034    DEXA  01/16/2039    HEPATITIS C SCREENING  Completed    PHQ-2 (once per calendar year)  Completed    PNEUMOCOCCAL VACCINE 50+ YEARS  Completed    ZOSTER VACCINE  Completed    RSV VACCINE  Completed    HPV VACCINE  Aged Out    MENINGITIS VACCINE  Aged Out     Review of Systems  Constitutional, HEENT, cardiovascular, pulmonary, GI, , musculoskeletal, neuro, skin, endocrine and psych systems are negative, except as otherwise noted.     Objective    Exam  /70 (BP Location: Right arm, Patient Position: Sitting)   Pulse 80   Temp 98.2  F (36.8  C)   Resp 16   Ht 1.631 m (5' 4.21\")   Wt 56.2 kg (124 lb)   SpO2 98%   BMI 21.14 kg/m     Estimated body mass index is 21.14 kg/m  as calculated from the following:    Height as of this encounter: 1.631 m (5' 4.21\").    Weight as of this encounter: 56.2 kg (124 lb).    Physical Exam  GENERAL APPEARANCE: healthy, alert and no distress  EYES: Eyes grossly normal to inspection, PERRL and conjunctivae and sclerae normal  HENT: ear canals and TM's normal, nose and mouth without ulcers or lesions, oropharynx clear and oral mucous membranes moist  NECK: no adenopathy,   RESP: lungs clear to auscultation - no rales, rhonchi or wheezes  BREAST: normal without masses, tenderness or nipple discharge and no palpable axillary masses or adenopathy  CV: regular rate and rhythm, normal S1 S2, no S3   ABDOMEN: soft, nontender, no hepatosplenomegaly, no masses and bowel sounds normal  MS: no musculoskeletal defects are noted and gait is age appropriate without " ataxia.   SKIN: no suspicious lesions or rashes. Seborrheic keratosis lesion on face, pt sees dermatology.   NEURO: normal, mentation intact and speech normal  PSYCH: mentation appears normal and affect normal/bright         7/14/2025   Mini Cog   Clock Draw Score 2 Normal   3 Item Recall 3 objects recalled   Mini Cog Total Score 5        Signed Electronically by: Tammy Boyer MD  Scribe Disclosure:   IApril, am serving as a scribe; to document services personally performed by Tammy Boyer MD -based on data collection and the provider's statements to me.

## 2025-08-28 ENCOUNTER — INFUSION THERAPY VISIT (OUTPATIENT)
Dept: INFUSION THERAPY | Facility: CLINIC | Age: 70
End: 2025-08-28
Attending: INTERNAL MEDICINE
Payer: MEDICARE

## 2025-08-28 VITALS
TEMPERATURE: 97.5 F | RESPIRATION RATE: 18 BRPM | SYSTOLIC BLOOD PRESSURE: 123 MMHG | WEIGHT: 124.4 LBS | OXYGEN SATURATION: 98 % | BODY MASS INDEX: 21.21 KG/M2 | DIASTOLIC BLOOD PRESSURE: 85 MMHG | HEART RATE: 90 BPM

## 2025-08-28 DIAGNOSIS — K21.9 GASTROESOPHAGEAL REFLUX DISEASE WITHOUT ESOPHAGITIS: Primary | ICD-10-CM

## 2025-08-28 DIAGNOSIS — M81.0 AGE-RELATED OSTEOPOROSIS WITHOUT CURRENT PATHOLOGICAL FRACTURE: ICD-10-CM

## 2025-08-28 PROCEDURE — 250N000011 HC RX IP 250 OP 636: Performed by: INTERNAL MEDICINE

## 2025-08-28 RX ORDER — DIPHENHYDRAMINE HYDROCHLORIDE 50 MG/ML
25 INJECTION, SOLUTION INTRAMUSCULAR; INTRAVENOUS
Start: 2026-08-28

## 2025-08-28 RX ORDER — MEPERIDINE HYDROCHLORIDE 25 MG/ML
25 INJECTION INTRAMUSCULAR; INTRAVENOUS; SUBCUTANEOUS
OUTPATIENT
Start: 2026-08-28

## 2025-08-28 RX ORDER — ALBUTEROL SULFATE 0.83 MG/ML
2.5 SOLUTION RESPIRATORY (INHALATION)
OUTPATIENT
Start: 2026-08-28

## 2025-08-28 RX ORDER — HEPARIN SODIUM (PORCINE) LOCK FLUSH IV SOLN 100 UNIT/ML 100 UNIT/ML
5 SOLUTION INTRAVENOUS
OUTPATIENT
Start: 2026-08-28

## 2025-08-28 RX ORDER — ALBUTEROL SULFATE 90 UG/1
1-2 INHALANT RESPIRATORY (INHALATION)
Start: 2026-08-28

## 2025-08-28 RX ORDER — DIPHENHYDRAMINE HYDROCHLORIDE 50 MG/ML
50 INJECTION, SOLUTION INTRAMUSCULAR; INTRAVENOUS
Start: 2026-08-28

## 2025-08-28 RX ORDER — HEPARIN SODIUM,PORCINE 10 UNIT/ML
5-20 VIAL (ML) INTRAVENOUS DAILY PRN
OUTPATIENT
Start: 2026-08-28

## 2025-08-28 RX ORDER — ZOLEDRONIC ACID 0.05 MG/ML
5 INJECTION, SOLUTION INTRAVENOUS ONCE
Status: COMPLETED | OUTPATIENT
Start: 2025-08-28 | End: 2025-08-28

## 2025-08-28 RX ORDER — ACETAMINOPHEN 325 MG/1
650 TABLET ORAL
OUTPATIENT
Start: 2026-08-28

## 2025-08-28 RX ORDER — EPINEPHRINE 1 MG/ML
0.3 INJECTION, SOLUTION INTRAMUSCULAR; SUBCUTANEOUS EVERY 5 MIN PRN
OUTPATIENT
Start: 2026-08-28

## 2025-08-28 RX ORDER — METHYLPREDNISOLONE SODIUM SUCCINATE 40 MG/ML
40 INJECTION INTRAMUSCULAR; INTRAVENOUS
Start: 2026-08-28

## 2025-08-28 RX ORDER — ZOLEDRONIC ACID 0.05 MG/ML
5 INJECTION, SOLUTION INTRAVENOUS ONCE
Start: 2026-08-28

## 2025-08-28 RX ADMIN — ZOLEDRONIC ACID 5 MG: 0.05 INJECTION, SOLUTION INTRAVENOUS at 10:32

## 2025-08-28 ASSESSMENT — PAIN SCALES - GENERAL: PAINLEVEL_OUTOF10: NO PAIN (0)

## (undated) RX ORDER — FENTANYL CITRATE 50 UG/ML
INJECTION, SOLUTION INTRAMUSCULAR; INTRAVENOUS
Status: DISPENSED
Start: 2022-02-22